# Patient Record
Sex: FEMALE | Race: WHITE | NOT HISPANIC OR LATINO | URBAN - METROPOLITAN AREA
[De-identification: names, ages, dates, MRNs, and addresses within clinical notes are randomized per-mention and may not be internally consistent; named-entity substitution may affect disease eponyms.]

---

## 2019-01-01 ENCOUNTER — HOSPITAL ENCOUNTER (OUTPATIENT)
Dept: PET IMAGING | Facility: HOSPITAL | Age: 69
Setting detail: RADIATION/ONCOLOGY SERIES
Discharge: STILL ADMITTED OR ADMINISTRATIVE | End: 2019-11-04
Attending: INTERNAL MEDICINE

## 2019-01-01 ENCOUNTER — INFUSION - HEALTHEAST (OUTPATIENT)
Dept: INFUSION THERAPY | Facility: CLINIC | Age: 69
End: 2019-01-01

## 2019-01-01 ENCOUNTER — OFFICE VISIT - HEALTHEAST (OUTPATIENT)
Dept: ONCOLOGY | Facility: CLINIC | Age: 69
End: 2019-01-01

## 2019-01-01 ENCOUNTER — COMMUNICATION - HEALTHEAST (OUTPATIENT)
Dept: ONCOLOGY | Facility: HOSPITAL | Age: 69
End: 2019-01-01

## 2019-01-01 ENCOUNTER — HOSPITAL ENCOUNTER (OUTPATIENT)
Dept: PET IMAGING | Facility: HOSPITAL | Age: 69
Setting detail: RADIATION/ONCOLOGY SERIES
Discharge: STILL ADMITTED OR ADMINISTRATIVE | End: 2019-12-31
Attending: INTERNAL MEDICINE

## 2019-01-01 ENCOUNTER — RECORDS - HEALTHEAST (OUTPATIENT)
Dept: ADMINISTRATIVE | Facility: OTHER | Age: 69
End: 2019-01-01

## 2019-01-01 ENCOUNTER — AMBULATORY - HEALTHEAST (OUTPATIENT)
Dept: INFUSION THERAPY | Facility: CLINIC | Age: 69
End: 2019-01-01

## 2019-01-01 ENCOUNTER — HOSPITAL ENCOUNTER (OUTPATIENT)
Dept: INTERVENTIONAL RADIOLOGY/VASCULAR | Facility: HOSPITAL | Age: 69
Setting detail: RADIATION/ONCOLOGY SERIES
Discharge: STILL ADMITTED OR ADMINISTRATIVE | End: 2019-09-03
Attending: INTERNAL MEDICINE

## 2019-01-01 ENCOUNTER — AMBULATORY - HEALTHEAST (OUTPATIENT)
Dept: ONCOLOGY | Facility: HOSPITAL | Age: 69
End: 2019-01-01

## 2019-01-01 ENCOUNTER — HOSPITAL ENCOUNTER (OUTPATIENT)
Dept: PET IMAGING | Facility: HOSPITAL | Age: 69
Setting detail: RADIATION/ONCOLOGY SERIES
Discharge: STILL ADMITTED OR ADMINISTRATIVE | End: 2019-08-30
Attending: INTERNAL MEDICINE

## 2019-01-01 ENCOUNTER — RECORDS - HEALTHEAST (OUTPATIENT)
Dept: LAB | Facility: CLINIC | Age: 69
End: 2019-01-01

## 2019-01-01 ENCOUNTER — HOSPITAL ENCOUNTER (OUTPATIENT)
Dept: INTERVENTIONAL RADIOLOGY/VASCULAR | Facility: CLINIC | Age: 69
Discharge: HOME | End: 2019-08-19
Attending: FAMILY MEDICINE

## 2019-01-01 DIAGNOSIS — Z51.11 ENCOUNTER FOR ANTINEOPLASTIC CHEMOTHERAPY: ICD-10-CM

## 2019-01-01 DIAGNOSIS — C25.1 MALIGNANT NEOPLASM OF BODY OF PANCREAS (H): ICD-10-CM

## 2019-01-01 DIAGNOSIS — C78.7 LIVER METASTASES: ICD-10-CM

## 2019-01-01 DIAGNOSIS — E83.42 HYPOMAGNESEMIA: ICD-10-CM

## 2019-01-01 DIAGNOSIS — E87.6 HYPOKALEMIA: ICD-10-CM

## 2019-01-01 DIAGNOSIS — K63.9 LESION OF COLON: ICD-10-CM

## 2019-01-01 DIAGNOSIS — Z80.3 FAMILY HISTORY OF MALIGNANT NEOPLASM OF BREAST: ICD-10-CM

## 2019-01-01 DIAGNOSIS — E88.09 HYPOALBUMINEMIA: ICD-10-CM

## 2019-01-01 LAB
ALBUMIN SERPL-MCNC: 2.8 G/DL (ref 3.5–5)
ALBUMIN SERPL-MCNC: 2.9 G/DL (ref 3.5–5)
ALBUMIN SERPL-MCNC: 2.9 G/DL (ref 3.5–5)
ALBUMIN SERPL-MCNC: 3 G/DL (ref 3.5–5)
ALBUMIN SERPL-MCNC: 3.2 G/DL (ref 3.5–5)
ALBUMIN SERPL-MCNC: 3.5 G/DL (ref 3.5–5)
ALBUMIN SERPL-MCNC: 3.8 G/DL (ref 3.5–5)
ALP SERPL-CCNC: 159 U/L (ref 45–120)
ALP SERPL-CCNC: 195 U/L (ref 45–120)
ALP SERPL-CCNC: 196 U/L (ref 45–120)
ALP SERPL-CCNC: 232 U/L (ref 45–120)
ALP SERPL-CCNC: 268 U/L (ref 45–120)
ALP SERPL-CCNC: 271 U/L (ref 45–120)
ALP SERPL-CCNC: 298 U/L (ref 45–120)
ALP SERPL-CCNC: 361 U/L (ref 45–120)
ALP SERPL-CCNC: 413 U/L (ref 45–120)
ALP SERPL-CCNC: 424 U/L (ref 45–120)
ALT SERPL W P-5'-P-CCNC: 10 U/L (ref 0–45)
ALT SERPL W P-5'-P-CCNC: 12 U/L (ref 0–45)
ALT SERPL W P-5'-P-CCNC: 15 U/L (ref 0–45)
ALT SERPL W P-5'-P-CCNC: 18 U/L (ref 0–45)
ALT SERPL W P-5'-P-CCNC: 19 U/L (ref 0–45)
ALT SERPL W P-5'-P-CCNC: 23 U/L (ref 0–45)
ALT SERPL W P-5'-P-CCNC: 26 U/L (ref 0–45)
ALT SERPL W P-5'-P-CCNC: 29 U/L (ref 0–45)
ALT SERPL W P-5'-P-CCNC: 32 U/L (ref 0–45)
ALT SERPL W P-5'-P-CCNC: 41 U/L (ref 0–45)
ANION GAP SERPL CALCULATED.3IONS-SCNC: 10 MMOL/L (ref 5–18)
ANION GAP SERPL CALCULATED.3IONS-SCNC: 11 MMOL/L (ref 5–18)
ANION GAP SERPL CALCULATED.3IONS-SCNC: 11 MMOL/L (ref 5–18)
ANION GAP SERPL CALCULATED.3IONS-SCNC: 12 MMOL/L (ref 5–18)
ANION GAP SERPL CALCULATED.3IONS-SCNC: 13 MMOL/L (ref 5–18)
ANION GAP SERPL CALCULATED.3IONS-SCNC: 17 MMOL/L (ref 5–18)
ANION GAP SERPL CALCULATED.3IONS-SCNC: 9 MMOL/L (ref 5–18)
ANION GAP SERPL CALCULATED.3IONS-SCNC: 9 MMOL/L (ref 5–18)
APTT PPP: 25 SECONDS (ref 24–37)
AST SERPL W P-5'-P-CCNC: 21 U/L (ref 0–40)
AST SERPL W P-5'-P-CCNC: 22 U/L (ref 0–40)
AST SERPL W P-5'-P-CCNC: 23 U/L (ref 0–40)
AST SERPL W P-5'-P-CCNC: 27 U/L (ref 0–40)
AST SERPL W P-5'-P-CCNC: 38 U/L (ref 0–40)
AST SERPL W P-5'-P-CCNC: 38 U/L (ref 0–40)
AST SERPL W P-5'-P-CCNC: 40 U/L (ref 0–40)
AST SERPL W P-5'-P-CCNC: 42 U/L (ref 0–40)
AST SERPL W P-5'-P-CCNC: 43 U/L (ref 0–40)
AST SERPL W P-5'-P-CCNC: 59 U/L (ref 0–40)
BASOPHILS # BLD AUTO: 0 THOU/UL (ref 0–0.2)
BASOPHILS # BLD AUTO: 0.1 THOU/UL (ref 0–0.2)
BASOPHILS NFR BLD AUTO: 0 % (ref 0–2)
BASOPHILS NFR BLD AUTO: 1 % (ref 0–2)
BILIRUB SERPL-MCNC: 0.5 MG/DL (ref 0–1)
BILIRUB SERPL-MCNC: 0.6 MG/DL (ref 0–1)
BILIRUB SERPL-MCNC: 0.6 MG/DL (ref 0–1)
BILIRUB SERPL-MCNC: 0.8 MG/DL (ref 0–1)
BUN SERPL-MCNC: 10 MG/DL (ref 8–22)
BUN SERPL-MCNC: 11 MG/DL (ref 8–22)
BUN SERPL-MCNC: 12 MG/DL (ref 8–22)
BUN SERPL-MCNC: 13 MG/DL (ref 8–22)
BUN SERPL-MCNC: 14 MG/DL (ref 8–22)
BUN SERPL-MCNC: 16 MG/DL (ref 8–22)
BUN SERPL-MCNC: 6 MG/DL (ref 8–22)
BUN SERPL-MCNC: 9 MG/DL (ref 8–22)
CALCIUM SERPL-MCNC: 10.3 MG/DL (ref 8.5–10.5)
CALCIUM SERPL-MCNC: 10.5 MG/DL (ref 8.5–10.5)
CALCIUM SERPL-MCNC: 8.8 MG/DL (ref 8.5–10.5)
CALCIUM SERPL-MCNC: 9.1 MG/DL (ref 8.5–10.5)
CALCIUM SERPL-MCNC: 9.2 MG/DL (ref 8.5–10.5)
CALCIUM SERPL-MCNC: 9.3 MG/DL (ref 8.5–10.5)
CALCIUM SERPL-MCNC: 9.4 MG/DL (ref 8.5–10.5)
CALCIUM SERPL-MCNC: 9.5 MG/DL (ref 8.5–10.5)
CALCIUM SERPL-MCNC: 9.6 MG/DL (ref 8.5–10.5)
CALCIUM SERPL-MCNC: 9.6 MG/DL (ref 8.5–10.5)
CANCER AG19-9 SERPL IA-ACNC: ABNORMAL U/ML (ref 0–37)
CEA SERPL-MCNC: 4.4 NG/ML (ref 0–3)
CHLORIDE BLD-SCNC: 100 MMOL/L (ref 98–107)
CHLORIDE BLD-SCNC: 101 MMOL/L (ref 98–107)
CHLORIDE BLD-SCNC: 102 MMOL/L (ref 98–107)
CHLORIDE BLD-SCNC: 105 MMOL/L (ref 98–107)
CHLORIDE BLD-SCNC: 98 MMOL/L (ref 98–107)
CHLORIDE BLD-SCNC: 99 MMOL/L (ref 98–107)
CO2 SERPL-SCNC: 21 MMOL/L (ref 22–31)
CO2 SERPL-SCNC: 24 MMOL/L (ref 22–31)
CO2 SERPL-SCNC: 24 MMOL/L (ref 22–31)
CO2 SERPL-SCNC: 25 MMOL/L (ref 22–31)
CO2 SERPL-SCNC: 27 MMOL/L (ref 22–31)
CREAT SERPL-MCNC: 0.57 MG/DL (ref 0.6–1.1)
CREAT SERPL-MCNC: 0.64 MG/DL (ref 0.6–1.1)
CREAT SERPL-MCNC: 0.66 MG/DL (ref 0.6–1.1)
CREAT SERPL-MCNC: 0.67 MG/DL (ref 0.6–1.1)
CREAT SERPL-MCNC: 0.68 MG/DL (ref 0.6–1.1)
CREAT SERPL-MCNC: 0.68 MG/DL (ref 0.6–1.1)
CREAT SERPL-MCNC: 0.69 MG/DL (ref 0.6–1.1)
CREAT SERPL-MCNC: 0.7 MG/DL (ref 0.6–1.1)
CREAT SERPL-MCNC: 0.7 MG/DL (ref 0.6–1.1)
CREAT SERPL-MCNC: 0.73 MG/DL (ref 0.6–1.1)
EOSINOPHIL # BLD AUTO: 0.1 THOU/UL (ref 0–0.4)
EOSINOPHIL # BLD AUTO: 0.2 THOU/UL (ref 0–0.4)
EOSINOPHIL # BLD AUTO: 0.2 THOU/UL (ref 0–0.4)
EOSINOPHIL # BLD AUTO: 0.4 THOU/UL (ref 0–0.4)
EOSINOPHIL # BLD AUTO: 0.4 THOU/UL (ref 0–0.4)
EOSINOPHIL NFR BLD AUTO: 1 % (ref 0–6)
EOSINOPHIL NFR BLD AUTO: 2 % (ref 0–6)
EOSINOPHIL NFR BLD AUTO: 4 % (ref 0–6)
EOSINOPHIL NFR BLD AUTO: 5 % (ref 0–6)
ERYTHROCYTE [DISTWIDTH] IN BLOOD BY AUTOMATED COUNT: 12.6 % (ref 11–14.5)
ERYTHROCYTE [DISTWIDTH] IN BLOOD BY AUTOMATED COUNT: 13.2 % (ref 11–14.5)
ERYTHROCYTE [DISTWIDTH] IN BLOOD BY AUTOMATED COUNT: 14 % (ref 11–14.5)
ERYTHROCYTE [DISTWIDTH] IN BLOOD BY AUTOMATED COUNT: 15.6 % (ref 11–14.5)
ERYTHROCYTE [DISTWIDTH] IN BLOOD BY AUTOMATED COUNT: 17.9 % (ref 11–14.5)
ERYTHROCYTE [DISTWIDTH] IN BLOOD BY AUTOMATED COUNT: 18.9 % (ref 11–14.5)
ERYTHROCYTE [DISTWIDTH] IN BLOOD BY AUTOMATED COUNT: 19.6 % (ref 11–14.5)
ERYTHROCYTE [DISTWIDTH] IN BLOOD BY AUTOMATED COUNT: 19.8 % (ref 11–14.5)
ERYTHROCYTE [DISTWIDTH] IN BLOOD BY AUTOMATED COUNT: 20 % (ref 11–14.5)
GFR SERPL CREATININE-BSD FRML MDRD: >60 ML/MIN/1.73M2
GLUCOSE BLD-MCNC: 122 MG/DL (ref 70–125)
GLUCOSE BLD-MCNC: 123 MG/DL (ref 70–125)
GLUCOSE BLD-MCNC: 124 MG/DL (ref 70–125)
GLUCOSE BLD-MCNC: 140 MG/DL (ref 70–125)
GLUCOSE BLD-MCNC: 141 MG/DL (ref 70–125)
GLUCOSE BLD-MCNC: 155 MG/DL (ref 70–125)
GLUCOSE BLD-MCNC: 163 MG/DL (ref 70–125)
GLUCOSE BLD-MCNC: 173 MG/DL (ref 70–125)
GLUCOSE BLD-MCNC: 190 MG/DL (ref 70–125)
GLUCOSE BLD-MCNC: 192 MG/DL (ref 70–125)
GLUCOSE BLDC GLUCOMTR-MCNC: 121 MG/DL (ref 70–139)
GLUCOSE BLDC GLUCOMTR-MCNC: 127 MG/DL (ref 70–139)
GLUCOSE BLDC GLUCOMTR-MCNC: 129 MG/DL (ref 70–139)
HCT VFR BLD AUTO: 31.5 % (ref 35–47)
HCT VFR BLD AUTO: 31.6 % (ref 35–47)
HCT VFR BLD AUTO: 32.5 % (ref 35–47)
HCT VFR BLD AUTO: 34.5 % (ref 35–47)
HCT VFR BLD AUTO: 35.1 % (ref 35–47)
HCT VFR BLD AUTO: 35.6 % (ref 35–47)
HCT VFR BLD AUTO: 35.9 % (ref 35–47)
HCT VFR BLD AUTO: 36.3 % (ref 35–47)
HCT VFR BLD AUTO: 39 % (ref 35–47)
HGB BLD-MCNC: 10.2 G/DL (ref 12–16)
HGB BLD-MCNC: 10.3 G/DL (ref 12–16)
HGB BLD-MCNC: 10.5 G/DL (ref 12–16)
HGB BLD-MCNC: 11.2 G/DL (ref 12–16)
HGB BLD-MCNC: 11.2 G/DL (ref 12–16)
HGB BLD-MCNC: 11.4 G/DL (ref 12–16)
HGB BLD-MCNC: 11.6 G/DL (ref 12–16)
HGB BLD-MCNC: 11.8 G/DL (ref 12–16)
HGB BLD-MCNC: 12.8 G/DL (ref 12–16)
INR PPP: 1.07 (ref 0.9–1.1)
LIPASE SERPL-CCNC: 9 U/L (ref 0–52)
LYMPHOCYTES # BLD AUTO: 0.8 THOU/UL (ref 0.8–4.4)
LYMPHOCYTES # BLD AUTO: 0.9 THOU/UL (ref 0.8–4.4)
LYMPHOCYTES # BLD AUTO: 1 THOU/UL (ref 0.8–4.4)
LYMPHOCYTES # BLD AUTO: 1.1 THOU/UL (ref 0.8–4.4)
LYMPHOCYTES # BLD AUTO: 1.1 THOU/UL (ref 0.8–4.4)
LYMPHOCYTES # BLD AUTO: 1.2 THOU/UL (ref 0.8–4.4)
LYMPHOCYTES # BLD AUTO: 1.3 THOU/UL (ref 0.8–4.4)
LYMPHOCYTES # BLD AUTO: 1.4 THOU/UL (ref 0.8–4.4)
LYMPHOCYTES # BLD AUTO: 1.4 THOU/UL (ref 0.8–4.4)
LYMPHOCYTES NFR BLD AUTO: 10 % (ref 20–40)
LYMPHOCYTES NFR BLD AUTO: 12 % (ref 20–40)
LYMPHOCYTES NFR BLD AUTO: 12 % (ref 20–40)
LYMPHOCYTES NFR BLD AUTO: 13 % (ref 20–40)
LYMPHOCYTES NFR BLD AUTO: 14 % (ref 20–40)
LYMPHOCYTES NFR BLD AUTO: 16 % (ref 20–40)
LYMPHOCYTES NFR BLD AUTO: 8 % (ref 20–40)
MAGNESIUM SERPL-MCNC: 1.6 MG/DL (ref 1.8–2.6)
MAGNESIUM SERPL-MCNC: 1.6 MG/DL (ref 1.8–2.6)
MAGNESIUM SERPL-MCNC: 1.7 MG/DL (ref 1.8–2.6)
MAGNESIUM SERPL-MCNC: 1.8 MG/DL (ref 1.8–2.6)
MAGNESIUM SERPL-MCNC: 1.9 MG/DL (ref 1.8–2.6)
MAGNESIUM SERPL-MCNC: 2 MG/DL (ref 1.8–2.6)
MAGNESIUM SERPL-MCNC: 2 MG/DL (ref 1.8–2.6)
MAGNESIUM SERPL-MCNC: 2.3 MG/DL (ref 1.8–2.6)
MCH RBC QN AUTO: 29.6 PG (ref 27–34)
MCH RBC QN AUTO: 29.9 PG (ref 27–34)
MCH RBC QN AUTO: 30.2 PG (ref 27–34)
MCH RBC QN AUTO: 30.7 PG (ref 27–34)
MCH RBC QN AUTO: 31 PG (ref 27–34)
MCH RBC QN AUTO: 31.6 PG (ref 27–34)
MCH RBC QN AUTO: 32.5 PG (ref 27–34)
MCHC RBC AUTO-ENTMCNC: 31.9 G/DL (ref 32–36)
MCHC RBC AUTO-ENTMCNC: 32 G/DL (ref 32–36)
MCHC RBC AUTO-ENTMCNC: 32.3 G/DL (ref 32–36)
MCHC RBC AUTO-ENTMCNC: 32.3 G/DL (ref 32–36)
MCHC RBC AUTO-ENTMCNC: 32.4 G/DL (ref 32–36)
MCHC RBC AUTO-ENTMCNC: 32.5 G/DL (ref 32–36)
MCHC RBC AUTO-ENTMCNC: 32.5 G/DL (ref 32–36)
MCHC RBC AUTO-ENTMCNC: 32.6 G/DL (ref 32–36)
MCHC RBC AUTO-ENTMCNC: 32.8 G/DL (ref 32–36)
MCV RBC AUTO: 100 FL (ref 80–100)
MCV RBC AUTO: 91 FL (ref 80–100)
MCV RBC AUTO: 92 FL (ref 80–100)
MCV RBC AUTO: 93 FL (ref 80–100)
MCV RBC AUTO: 93 FL (ref 80–100)
MCV RBC AUTO: 94 FL (ref 80–100)
MCV RBC AUTO: 95 FL (ref 80–100)
MCV RBC AUTO: 96 FL (ref 80–100)
MCV RBC AUTO: 98 FL (ref 80–100)
MONOCYTES # BLD AUTO: 0.6 THOU/UL (ref 0–0.9)
MONOCYTES # BLD AUTO: 0.6 THOU/UL (ref 0–0.9)
MONOCYTES # BLD AUTO: 0.7 THOU/UL (ref 0–0.9)
MONOCYTES # BLD AUTO: 0.8 THOU/UL (ref 0–0.9)
MONOCYTES # BLD AUTO: 0.9 THOU/UL (ref 0–0.9)
MONOCYTES NFR BLD AUTO: 10 % (ref 2–10)
MONOCYTES NFR BLD AUTO: 6 % (ref 2–10)
MONOCYTES NFR BLD AUTO: 7 % (ref 2–10)
MONOCYTES NFR BLD AUTO: 8 % (ref 2–10)
MONOCYTES NFR BLD AUTO: 9 % (ref 2–10)
MONOCYTES NFR BLD AUTO: 9 % (ref 2–10)
NEUTROPHILS # BLD AUTO: 5.3 THOU/UL (ref 2–7.7)
NEUTROPHILS # BLD AUTO: 5.6 THOU/UL (ref 2–7.7)
NEUTROPHILS # BLD AUTO: 5.6 THOU/UL (ref 2–7.7)
NEUTROPHILS # BLD AUTO: 7.2 THOU/UL (ref 2–7.7)
NEUTROPHILS # BLD AUTO: 7.3 THOU/UL (ref 2–7.7)
NEUTROPHILS # BLD AUTO: 7.5 THOU/UL (ref 2–7.7)
NEUTROPHILS # BLD AUTO: 8 THOU/UL (ref 2–7.7)
NEUTROPHILS # BLD AUTO: 8.1 THOU/UL (ref 2–7.7)
NEUTROPHILS # BLD AUTO: 9.7 THOU/UL (ref 2–7.7)
NEUTROPHILS NFR BLD AUTO: 74 % (ref 50–70)
NEUTROPHILS NFR BLD AUTO: 75 % (ref 50–70)
NEUTROPHILS NFR BLD AUTO: 76 % (ref 50–70)
NEUTROPHILS NFR BLD AUTO: 78 % (ref 50–70)
NEUTROPHILS NFR BLD AUTO: 79 % (ref 50–70)
NEUTROPHILS NFR BLD AUTO: 79 % (ref 50–70)
PLATELET # BLD AUTO: 121 THOU/UL (ref 140–440)
PLATELET # BLD AUTO: 130 THOU/UL (ref 140–440)
PLATELET # BLD AUTO: 139 THOU/UL (ref 140–440)
PLATELET # BLD AUTO: 166 THOU/UL (ref 140–440)
PLATELET # BLD AUTO: 201 THOU/UL (ref 140–440)
PLATELET # BLD AUTO: 234 THOU/UL (ref 140–440)
PLATELET # BLD AUTO: 237 THOU/UL (ref 140–440)
PLATELET # BLD AUTO: 244 THOU/UL (ref 140–440)
PLATELET # BLD AUTO: 258 THOU/UL (ref 140–440)
PLATELET # BLD AUTO: 273 THOU/UL (ref 140–440)
PMV BLD AUTO: 8.5 FL (ref 8.5–12.5)
PMV BLD AUTO: 8.9 FL (ref 8.5–12.5)
PMV BLD AUTO: 8.9 FL (ref 8.5–12.5)
PMV BLD AUTO: 9.1 FL (ref 8.5–12.5)
PMV BLD AUTO: 9.3 FL (ref 8.5–12.5)
PMV BLD AUTO: 9.4 FL (ref 8.5–12.5)
POTASSIUM BLD-SCNC: 3 MMOL/L (ref 3.5–5)
POTASSIUM BLD-SCNC: 3.4 MMOL/L (ref 3.5–5)
POTASSIUM BLD-SCNC: 3.5 MMOL/L (ref 3.5–5)
POTASSIUM BLD-SCNC: 3.5 MMOL/L (ref 3.5–5)
POTASSIUM BLD-SCNC: 3.8 MMOL/L (ref 3.5–5)
POTASSIUM BLD-SCNC: 4.1 MMOL/L (ref 3.5–5)
POTASSIUM BLD-SCNC: 4.2 MMOL/L (ref 3.5–5)
POTASSIUM BLD-SCNC: 4.2 MMOL/L (ref 3.5–5)
POTASSIUM BLD-SCNC: 4.3 MMOL/L (ref 3.5–5)
POTASSIUM BLD-SCNC: 4.4 MMOL/L (ref 3.5–5)
PROT SERPL-MCNC: 6.3 G/DL (ref 6–8)
PROT SERPL-MCNC: 6.4 G/DL (ref 6–8)
PROT SERPL-MCNC: 6.6 G/DL (ref 6–8)
PROT SERPL-MCNC: 6.8 G/DL (ref 6–8)
PROT SERPL-MCNC: 7.1 G/DL (ref 6–8)
PROT SERPL-MCNC: 7.2 G/DL (ref 6–8)
PROT SERPL-MCNC: 7.7 G/DL (ref 6–8)
PROT SERPL-MCNC: 8.2 G/DL (ref 6–8)
PROT SERPL-MCNC: 8.3 G/DL (ref 6–8)
PROT SERPL-MCNC: 8.7 G/DL (ref 6–8)
RBC # BLD AUTO: 3.17 MILL/UL (ref 3.8–5.4)
RBC # BLD AUTO: 3.23 MILL/UL (ref 3.8–5.4)
RBC # BLD AUTO: 3.39 MILL/UL (ref 3.8–5.4)
RBC # BLD AUTO: 3.65 MILL/UL (ref 3.8–5.4)
RBC # BLD AUTO: 3.78 MILL/UL (ref 3.8–5.4)
RBC # BLD AUTO: 3.78 MILL/UL (ref 3.8–5.4)
RBC # BLD AUTO: 3.88 MILL/UL (ref 3.8–5.4)
RBC # BLD AUTO: 3.94 MILL/UL (ref 3.8–5.4)
RBC # BLD AUTO: 4.28 MILL/UL (ref 3.8–5.4)
SODIUM SERPL-SCNC: 134 MMOL/L (ref 136–145)
SODIUM SERPL-SCNC: 136 MMOL/L (ref 136–145)
SODIUM SERPL-SCNC: 137 MMOL/L (ref 136–145)
SODIUM SERPL-SCNC: 137 MMOL/L (ref 136–145)
SODIUM SERPL-SCNC: 139 MMOL/L (ref 136–145)
SODIUM SERPL-SCNC: 139 MMOL/L (ref 136–145)
SODIUM SERPL-SCNC: 141 MMOL/L (ref 136–145)
WBC: 10.7 THOU/UL (ref 4–11)
WBC: 10.7 THOU/UL (ref 4–11)
WBC: 12.3 THOU/UL (ref 4–11)
WBC: 7.2 THOU/UL (ref 4–11)
WBC: 7.5 THOU/UL (ref 4–11)
WBC: 7.5 THOU/UL (ref 4–11)
WBC: 9.2 THOU/UL (ref 4–11)
WBC: 9.6 THOU/UL (ref 4–11)
WBC: 9.7 THOU/UL (ref 4–11)

## 2019-01-01 RX ORDER — ACETAMINOPHEN 500 MG
500 TABLET ORAL EVERY 6 HOURS PRN
Status: SHIPPED | COMMUNITY
Start: 2019-01-01

## 2019-01-01 RX ORDER — ONDANSETRON 8 MG/1
8 TABLET, FILM COATED ORAL EVERY 8 HOURS PRN
Qty: 30 TABLET | Refills: 3 | Status: SHIPPED | OUTPATIENT
Start: 2019-01-01

## 2019-01-01 ASSESSMENT — MIFFLIN-ST. JEOR
SCORE: 1246.63
SCORE: 1192.2
SCORE: 1254.34
SCORE: 1247.99
SCORE: 1183.13

## 2020-01-01 ENCOUNTER — AMBULATORY - HEALTHEAST (OUTPATIENT)
Dept: INFUSION THERAPY | Facility: CLINIC | Age: 70
End: 2020-01-01

## 2020-01-01 ENCOUNTER — OFFICE VISIT - HEALTHEAST (OUTPATIENT)
Dept: ONCOLOGY | Facility: CLINIC | Age: 70
End: 2020-01-01

## 2020-01-01 ENCOUNTER — INFUSION - HEALTHEAST (OUTPATIENT)
Dept: INFUSION THERAPY | Facility: CLINIC | Age: 70
End: 2020-01-01

## 2020-01-01 ENCOUNTER — COMMUNICATION - HEALTHEAST (OUTPATIENT)
Dept: ADMINISTRATIVE | Facility: HOSPITAL | Age: 70
End: 2020-01-01

## 2020-01-01 ENCOUNTER — INFUSION - HEALTHEAST (OUTPATIENT)
Dept: INFUSION THERAPY | Facility: HOSPITAL | Age: 70
End: 2020-01-01

## 2020-01-01 ENCOUNTER — AMBULATORY - HEALTHEAST (OUTPATIENT)
Dept: ONCOLOGY | Facility: CLINIC | Age: 70
End: 2020-01-01

## 2020-01-01 ENCOUNTER — AMBULATORY - HEALTHEAST (OUTPATIENT)
Dept: CARE COORDINATION | Facility: CLINIC | Age: 70
End: 2020-01-01

## 2020-01-01 ENCOUNTER — HOSPITAL ENCOUNTER (OUTPATIENT)
Dept: PET IMAGING | Facility: HOSPITAL | Age: 70
Setting detail: RADIATION/ONCOLOGY SERIES
Discharge: STILL ADMITTED OR ADMINISTRATIVE | End: 2020-03-02
Attending: INTERNAL MEDICINE

## 2020-01-01 ENCOUNTER — COMMUNICATION - HEALTHEAST (OUTPATIENT)
Dept: CARE COORDINATION | Facility: CLINIC | Age: 70
End: 2020-01-01

## 2020-01-01 DIAGNOSIS — K63.9 LESION OF COLON: ICD-10-CM

## 2020-01-01 DIAGNOSIS — C25.1 MALIGNANT NEOPLASM OF BODY OF PANCREAS (H): ICD-10-CM

## 2020-01-01 DIAGNOSIS — Z51.11 ENCOUNTER FOR ANTINEOPLASTIC CHEMOTHERAPY: ICD-10-CM

## 2020-01-01 DIAGNOSIS — D69.59 CHEMOTHERAPY-INDUCED THROMBOCYTOPENIA: ICD-10-CM

## 2020-01-01 DIAGNOSIS — T45.1X5A CHEMOTHERAPY-INDUCED THROMBOCYTOPENIA: ICD-10-CM

## 2020-01-01 DIAGNOSIS — C78.7 LIVER METASTASES: ICD-10-CM

## 2020-01-01 DIAGNOSIS — E88.09 HYPOALBUMINEMIA: ICD-10-CM

## 2020-01-01 DIAGNOSIS — Z80.3 FAMILY HISTORY OF MALIGNANT NEOPLASM OF BREAST: ICD-10-CM

## 2020-01-01 DIAGNOSIS — E87.6 HYPOKALEMIA: ICD-10-CM

## 2020-01-01 DIAGNOSIS — E83.42 HYPOMAGNESEMIA: ICD-10-CM

## 2020-01-01 DIAGNOSIS — I63.512 ACUTE ISCHEMIC LEFT MCA STROKE (H): ICD-10-CM

## 2020-01-01 LAB
ALBUMIN SERPL-MCNC: 2.9 G/DL (ref 3.5–5)
ALBUMIN SERPL-MCNC: 2.9 G/DL (ref 3.5–5)
ALBUMIN SERPL-MCNC: 3 G/DL (ref 3.5–5)
ALBUMIN SERPL-MCNC: 3 G/DL (ref 3.5–5)
ALBUMIN SERPL-MCNC: 3.1 G/DL (ref 3.5–5)
ALP SERPL-CCNC: 178 U/L (ref 45–120)
ALP SERPL-CCNC: 189 U/L (ref 45–120)
ALP SERPL-CCNC: 198 U/L (ref 45–120)
ALP SERPL-CCNC: 217 U/L (ref 45–120)
ALP SERPL-CCNC: 237 U/L (ref 45–120)
ALT SERPL W P-5'-P-CCNC: 11 U/L (ref 0–45)
ALT SERPL W P-5'-P-CCNC: 11 U/L (ref 0–45)
ALT SERPL W P-5'-P-CCNC: 12 U/L (ref 0–45)
ALT SERPL W P-5'-P-CCNC: 14 U/L (ref 0–45)
ALT SERPL W P-5'-P-CCNC: 16 U/L (ref 0–45)
ANION GAP SERPL CALCULATED.3IONS-SCNC: 10 MMOL/L (ref 5–18)
ANION GAP SERPL CALCULATED.3IONS-SCNC: 11 MMOL/L (ref 5–18)
ANION GAP SERPL CALCULATED.3IONS-SCNC: 11 MMOL/L (ref 5–18)
ANION GAP SERPL CALCULATED.3IONS-SCNC: 12 MMOL/L (ref 5–18)
ANION GAP SERPL CALCULATED.3IONS-SCNC: 12 MMOL/L (ref 5–18)
AST SERPL W P-5'-P-CCNC: 20 U/L (ref 0–40)
AST SERPL W P-5'-P-CCNC: 24 U/L (ref 0–40)
AST SERPL W P-5'-P-CCNC: 26 U/L (ref 0–40)
AST SERPL W P-5'-P-CCNC: 28 U/L (ref 0–40)
AST SERPL W P-5'-P-CCNC: 29 U/L (ref 0–40)
BASOPHILS # BLD AUTO: 0 THOU/UL (ref 0–0.2)
BASOPHILS NFR BLD AUTO: 0 % (ref 0–2)
BASOPHILS NFR BLD AUTO: 1 % (ref 0–2)
BILIRUB SERPL-MCNC: 0.5 MG/DL (ref 0–1)
BILIRUB SERPL-MCNC: 0.5 MG/DL (ref 0–1)
BILIRUB SERPL-MCNC: 0.6 MG/DL (ref 0–1)
BILIRUB SERPL-MCNC: 0.6 MG/DL (ref 0–1)
BILIRUB SERPL-MCNC: 0.9 MG/DL (ref 0–1)
BUN SERPL-MCNC: 13 MG/DL (ref 8–22)
BUN SERPL-MCNC: 14 MG/DL (ref 8–22)
BUN SERPL-MCNC: 18 MG/DL (ref 8–22)
CALCIUM SERPL-MCNC: 8.8 MG/DL (ref 8.5–10.5)
CALCIUM SERPL-MCNC: 9.1 MG/DL (ref 8.5–10.5)
CALCIUM SERPL-MCNC: 9.1 MG/DL (ref 8.5–10.5)
CALCIUM SERPL-MCNC: 9.2 MG/DL (ref 8.5–10.5)
CALCIUM SERPL-MCNC: 9.4 MG/DL (ref 8.5–10.5)
CANCER AG19-9 SERPL IA-ACNC: 8760 U/ML (ref 0–37)
CANCER AG19-9 SERPL IA-ACNC: ABNORMAL U/ML (ref 0–37)
CHLORIDE BLD-SCNC: 101 MMOL/L (ref 98–107)
CHLORIDE BLD-SCNC: 104 MMOL/L (ref 98–107)
CHLORIDE BLD-SCNC: 105 MMOL/L (ref 98–107)
CO2 SERPL-SCNC: 20 MMOL/L (ref 22–31)
CO2 SERPL-SCNC: 22 MMOL/L (ref 22–31)
CO2 SERPL-SCNC: 24 MMOL/L (ref 22–31)
CREAT SERPL-MCNC: 0.52 MG/DL (ref 0.6–1.1)
CREAT SERPL-MCNC: 0.57 MG/DL (ref 0.6–1.1)
CREAT SERPL-MCNC: 0.57 MG/DL (ref 0.6–1.1)
CREAT SERPL-MCNC: 0.6 MG/DL (ref 0.6–1.1)
CREAT SERPL-MCNC: 0.6 MG/DL (ref 0.6–1.1)
EOSINOPHIL # BLD AUTO: 0.1 THOU/UL (ref 0–0.4)
EOSINOPHIL # BLD AUTO: 0.2 THOU/UL (ref 0–0.4)
EOSINOPHIL # BLD AUTO: 0.2 THOU/UL (ref 0–0.4)
EOSINOPHIL NFR BLD AUTO: 1 % (ref 0–6)
EOSINOPHIL NFR BLD AUTO: 2 % (ref 0–6)
EOSINOPHIL NFR BLD AUTO: 3 % (ref 0–6)
ERYTHROCYTE [DISTWIDTH] IN BLOOD BY AUTOMATED COUNT: 17.4 % (ref 11–14.5)
ERYTHROCYTE [DISTWIDTH] IN BLOOD BY AUTOMATED COUNT: 17.9 % (ref 11–14.5)
ERYTHROCYTE [DISTWIDTH] IN BLOOD BY AUTOMATED COUNT: 19.6 % (ref 11–14.5)
GFR SERPL CREATININE-BSD FRML MDRD: >60 ML/MIN/1.73M2
GLUCOSE BLD-MCNC: 112 MG/DL (ref 70–125)
GLUCOSE BLD-MCNC: 132 MG/DL (ref 70–125)
GLUCOSE BLD-MCNC: 142 MG/DL (ref 70–125)
GLUCOSE BLD-MCNC: 151 MG/DL (ref 70–125)
GLUCOSE BLD-MCNC: 165 MG/DL (ref 70–125)
GLUCOSE BLDC GLUCOMTR-MCNC: 128 MG/DL (ref 70–139)
HCT VFR BLD AUTO: 29.1 % (ref 35–47)
HCT VFR BLD AUTO: 29.6 % (ref 35–47)
HCT VFR BLD AUTO: 30.6 % (ref 35–47)
HCT VFR BLD AUTO: 30.7 % (ref 35–47)
HCT VFR BLD AUTO: 31.6 % (ref 35–47)
HGB BLD-MCNC: 10.1 G/DL (ref 12–16)
HGB BLD-MCNC: 9.3 G/DL (ref 12–16)
HGB BLD-MCNC: 9.5 G/DL (ref 12–16)
HGB BLD-MCNC: 9.7 G/DL (ref 12–16)
HGB BLD-MCNC: 9.8 G/DL (ref 12–16)
LYMPHOCYTES # BLD AUTO: 0.8 THOU/UL (ref 0.8–4.4)
LYMPHOCYTES # BLD AUTO: 0.8 THOU/UL (ref 0.8–4.4)
LYMPHOCYTES # BLD AUTO: 0.9 THOU/UL (ref 0.8–4.4)
LYMPHOCYTES # BLD AUTO: 1 THOU/UL (ref 0.8–4.4)
LYMPHOCYTES # BLD AUTO: 1 THOU/UL (ref 0.8–4.4)
LYMPHOCYTES NFR BLD AUTO: 10 % (ref 20–40)
LYMPHOCYTES NFR BLD AUTO: 11 % (ref 20–40)
LYMPHOCYTES NFR BLD AUTO: 11 % (ref 20–40)
LYMPHOCYTES NFR BLD AUTO: 12 % (ref 20–40)
LYMPHOCYTES NFR BLD AUTO: 16 % (ref 20–40)
MAGNESIUM SERPL-MCNC: 1.5 MG/DL (ref 1.8–2.6)
MAGNESIUM SERPL-MCNC: 1.6 MG/DL (ref 1.8–2.6)
MAGNESIUM SERPL-MCNC: 1.8 MG/DL (ref 1.8–2.6)
MCH RBC QN AUTO: 33.1 PG (ref 27–34)
MCH RBC QN AUTO: 33.5 PG (ref 27–34)
MCH RBC QN AUTO: 33.8 PG (ref 27–34)
MCH RBC QN AUTO: 33.8 PG (ref 27–34)
MCH RBC QN AUTO: 34.1 PG (ref 27–34)
MCHC RBC AUTO-ENTMCNC: 31.6 G/DL (ref 32–36)
MCHC RBC AUTO-ENTMCNC: 32 G/DL (ref 32–36)
MCHC RBC AUTO-ENTMCNC: 32.1 G/DL (ref 32–36)
MCV RBC AUTO: 103 FL (ref 80–100)
MCV RBC AUTO: 104 FL (ref 80–100)
MCV RBC AUTO: 106 FL (ref 80–100)
MCV RBC AUTO: 107 FL (ref 80–100)
MCV RBC AUTO: 107 FL (ref 80–100)
MONOCYTES # BLD AUTO: 0.6 THOU/UL (ref 0–0.9)
MONOCYTES # BLD AUTO: 0.6 THOU/UL (ref 0–0.9)
MONOCYTES # BLD AUTO: 0.7 THOU/UL (ref 0–0.9)
MONOCYTES # BLD AUTO: 0.8 THOU/UL (ref 0–0.9)
MONOCYTES # BLD AUTO: 0.8 THOU/UL (ref 0–0.9)
MONOCYTES NFR BLD AUTO: 12 % (ref 2–10)
MONOCYTES NFR BLD AUTO: 8 % (ref 2–10)
MONOCYTES NFR BLD AUTO: 9 % (ref 2–10)
NEUTROPHILS # BLD AUTO: 3.7 THOU/UL (ref 2–7.7)
NEUTROPHILS # BLD AUTO: 5.6 THOU/UL (ref 2–7.7)
NEUTROPHILS # BLD AUTO: 5.6 THOU/UL (ref 2–7.7)
NEUTROPHILS # BLD AUTO: 6.4 THOU/UL (ref 2–7.7)
NEUTROPHILS # BLD AUTO: 6.6 THOU/UL (ref 2–7.7)
NEUTROPHILS NFR BLD AUTO: 69 % (ref 50–70)
NEUTROPHILS NFR BLD AUTO: 77 % (ref 50–70)
NEUTROPHILS NFR BLD AUTO: 78 % (ref 50–70)
PLAT MORPH BLD: ABNORMAL
PLATELET # BLD AUTO: 121 THOU/UL (ref 140–440)
PLATELET # BLD AUTO: 123 THOU/UL (ref 140–440)
PLATELET # BLD AUTO: 125 THOU/UL (ref 140–440)
PLATELET # BLD AUTO: 126 THOU/UL (ref 140–440)
PLATELET # BLD AUTO: 85 THOU/UL (ref 140–440)
PMV BLD AUTO: 10.3 FL (ref 8.5–12.5)
PMV BLD AUTO: 9.4 FL (ref 8.5–12.5)
PMV BLD AUTO: 9.6 FL (ref 8.5–12.5)
PMV BLD AUTO: 9.8 FL (ref 8.5–12.5)
PMV BLD AUTO: 9.9 FL (ref 8.5–12.5)
POTASSIUM BLD-SCNC: 3.2 MMOL/L (ref 3.5–5)
POTASSIUM BLD-SCNC: 3.4 MMOL/L (ref 3.5–5)
POTASSIUM BLD-SCNC: 3.6 MMOL/L (ref 3.5–5)
POTASSIUM BLD-SCNC: 3.6 MMOL/L (ref 3.5–5)
POTASSIUM BLD-SCNC: 4.3 MMOL/L (ref 3.5–5)
PROT SERPL-MCNC: 6.3 G/DL (ref 6–8)
PROT SERPL-MCNC: 6.4 G/DL (ref 6–8)
PROT SERPL-MCNC: 6.5 G/DL (ref 6–8)
PROT SERPL-MCNC: 6.7 G/DL (ref 6–8)
PROT SERPL-MCNC: 6.7 G/DL (ref 6–8)
RBC # BLD AUTO: 2.73 MILL/UL (ref 3.8–5.4)
RBC # BLD AUTO: 2.84 MILL/UL (ref 3.8–5.4)
RBC # BLD AUTO: 2.87 MILL/UL (ref 3.8–5.4)
RBC # BLD AUTO: 2.96 MILL/UL (ref 3.8–5.4)
RBC # BLD AUTO: 2.99 MILL/UL (ref 3.8–5.4)
SODIUM SERPL-SCNC: 137 MMOL/L (ref 136–145)
SODIUM SERPL-SCNC: 138 MMOL/L (ref 136–145)
SODIUM SERPL-SCNC: 139 MMOL/L (ref 136–145)
WBC: 5.3 THOU/UL (ref 4–11)
WBC: 7.2 THOU/UL (ref 4–11)
WBC: 7.3 THOU/UL (ref 4–11)
WBC: 8.3 THOU/UL (ref 4–11)
WBC: 8.6 THOU/UL (ref 4–11)

## 2020-01-01 RX ORDER — POTASSIUM CHLORIDE 1500 MG/1
20 TABLET, EXTENDED RELEASE ORAL DAILY
Qty: 30 TABLET | Refills: 2 | Status: SHIPPED | OUTPATIENT
Start: 2020-01-01

## 2020-01-01 RX ORDER — MAGNESIUM OXIDE 400 MG/1
400 TABLET ORAL DAILY
Qty: 30 TABLET | Refills: 5 | Status: SHIPPED | OUTPATIENT
Start: 2020-01-01

## 2020-01-01 ASSESSMENT — MIFFLIN-ST. JEOR: SCORE: 1174.06

## 2021-05-26 VITALS
OXYGEN SATURATION: 99 % | TEMPERATURE: 97.6 F | DIASTOLIC BLOOD PRESSURE: 70 MMHG | HEART RATE: 88 BPM | SYSTOLIC BLOOD PRESSURE: 137 MMHG

## 2021-05-26 VITALS
HEART RATE: 81 BPM | OXYGEN SATURATION: 97 % | SYSTOLIC BLOOD PRESSURE: 130 MMHG | DIASTOLIC BLOOD PRESSURE: 73 MMHG | TEMPERATURE: 98 F

## 2021-05-26 VITALS
TEMPERATURE: 97.8 F | DIASTOLIC BLOOD PRESSURE: 83 MMHG | SYSTOLIC BLOOD PRESSURE: 143 MMHG | HEART RATE: 93 BPM | OXYGEN SATURATION: 97 %

## 2021-05-26 VITALS
TEMPERATURE: 98.3 F | OXYGEN SATURATION: 97 % | SYSTOLIC BLOOD PRESSURE: 129 MMHG | HEART RATE: 80 BPM | DIASTOLIC BLOOD PRESSURE: 81 MMHG

## 2021-05-26 VITALS
DIASTOLIC BLOOD PRESSURE: 77 MMHG | TEMPERATURE: 98 F | HEART RATE: 87 BPM | SYSTOLIC BLOOD PRESSURE: 137 MMHG | OXYGEN SATURATION: 96 %

## 2021-05-26 VITALS
TEMPERATURE: 97.8 F | HEART RATE: 91 BPM | SYSTOLIC BLOOD PRESSURE: 133 MMHG | DIASTOLIC BLOOD PRESSURE: 82 MMHG | OXYGEN SATURATION: 98 %

## 2021-05-26 VITALS
SYSTOLIC BLOOD PRESSURE: 146 MMHG | TEMPERATURE: 98 F | OXYGEN SATURATION: 97 % | DIASTOLIC BLOOD PRESSURE: 80 MMHG | HEART RATE: 100 BPM

## 2021-05-26 VITALS
DIASTOLIC BLOOD PRESSURE: 84 MMHG | SYSTOLIC BLOOD PRESSURE: 162 MMHG | HEART RATE: 102 BPM | TEMPERATURE: 97.5 F | OXYGEN SATURATION: 97 %

## 2021-05-26 VITALS
TEMPERATURE: 98.5 F | SYSTOLIC BLOOD PRESSURE: 138 MMHG | OXYGEN SATURATION: 98 % | DIASTOLIC BLOOD PRESSURE: 67 MMHG | HEART RATE: 98 BPM

## 2021-05-26 VITALS
SYSTOLIC BLOOD PRESSURE: 167 MMHG | TEMPERATURE: 98.8 F | DIASTOLIC BLOOD PRESSURE: 81 MMHG | OXYGEN SATURATION: 96 % | HEART RATE: 88 BPM

## 2021-05-26 VITALS
SYSTOLIC BLOOD PRESSURE: 119 MMHG | DIASTOLIC BLOOD PRESSURE: 69 MMHG | TEMPERATURE: 97.8 F | OXYGEN SATURATION: 97 % | HEART RATE: 82 BPM

## 2021-05-31 NOTE — TELEPHONE ENCOUNTER
Call placed to Glenis to check in and see if she had gotten her biopsy scheduled as I would like to assist in scheduling her in a timely manner to see the oncologist. Asked that she call me back at [phone], so I may assist in scheduling the appointment.

## 2021-05-31 NOTE — PROGRESS NOTES
In Basket Message received for Medical Oncology Consult, referring physician Dr Femi Mendoza.  Placed call to Glenis to schedule appointment. Same scheduled for Tuesday August 27th 2019 at 10:00 am with Dr Kahn, with a nurse appt at 09:30 am. USPS mail sent to Glenis with introduction letter, MD bio card, [facility] intake form, medication and allergy list, Honoring Choices, consent to communicate, and appointment letter. Work up for pancreatic cancer has been done at [facility], and [facility].  [facility] information on Pancreatic Cancer and Nutrition information was also sent to Glenis.     Will work with medical records to ensure any needed records are collected. DARWIN was sent to patient on 8/8/2019 along with a form for past MD's. Glenis states she has sent it back in the mail to me on 8/13 will give to medical records when it arrives.     Explained that I would be Glenis's's nurse navigator.  I generally provide assistance with psycho social needs including but not limited to, financial assistance, obtaining education materials, transportation assistance, help with meals, community programs, and help finding support or support groups, getting connected with our psychotherapist when needed.  I am also here to help guide you through our system.      I am in the office Monday thru Friday. [phone]. I am in and out of my office throughout the day, If I do not answer my phone please leave me a message and I will get back to you as soon as possible. If you are ever unsure of who to call you can always contact me and I will help assist you in any way that I can.  Medication or symptom management needs typically go through our triage nurse who can be reached by calling the main clinic number. [phone]    Please do not hesitate to contact me if you have any questions or concerns.      Explained need to arrive at time given 09:30 am and complete the health history form and medication and allergy list  and bring both to appointment.

## 2021-05-31 NOTE — CONSULTS
[facility] Hematology and Oncology Consult Note    Patient: Glenis Pop  MRN: [id number]  Date of Service: 08/27/2019        Reason for Visit    I was consulted by Dr. Mendoza regarding pancreatic cancer.    Assessment/Plan    Ms. Glenis Pop is a 69 y.o. woman with a minimal past history and who is very active.  She presented with some mild abdominal discomfort and backache that developed over the last 2 months.  The CT scan of the abdomen and pelvis that was done on 8/1/2019 showed numerous liver mets, a 35 mm mass in the body of the pancreas, upper abdominal lymph nodes that are enlarged mildly and a left adrenal lesion that appears to be a metastasis.  She has had an ultrasound-guided core needle biopsy done from 1 of the liver mets on 8/15/2019 which showed a poorly differentiated adenocarcinoma consistent with a pancreatic primary.  I have gone through her past medical records from the [employer] system and also from the [employer] system.  I have reviewed the pathology report.  I have reviewed her labs and have personally reviewed the images of the CT scan of the abdomen and pelvis.    1.  I reviewed the images and the report of the CT scan of the abdomen and pelvis with the patient and her daughter.  I explained to her that this appears to be a cancer of the pancreatic body that has spread to the liver, lymph nodes and the left adrenal gland.  I described the pathology report to her.  I discussed with her that from the information that we have, we can conclude that she has a stage IV cancer.  Unfortunately this means that it is not curable.  What we can do is to treat it appropriately to try to get her as much life expectancy as possible and to preserve her quality of life.  The mainstay of treatment will need to be systemic treatment like chemotherapy.  I will need some more information to decide which will be the best chemotherapy regimen to go for, balancing side effects and effectiveness.  We also need to  look to see whether she may be a candidate for one of the newer treatment options like targeted therapies and immunotherapies.  She was agreeable to doing the appropriate work-up and was agreeable to considering the possibility of systemic treatment including chemotherapy.    2.  To do appropriate staging for the bronchitic cancer, we will obtain the following labs today: CBC differential platelets, CMP, CA-19-9 level and a CEA level.    3.  For staging we will also obtain a PET CT scan which may show us other sites of disease.  I think this would also clarify whether the left adrenal nodule is metastasis or not.    4.  She will need a Port-A-Cath for chemotherapy.  I explained to her what the Port-A-Cath is and how it will be useful.  She was agreeable.  I have asked for this to be scheduled through interventional radiology.    5.  I will request for Foundation one CDX testing to be done on the original biopsy sample in the [employer] system.  Expanded to the patient that this testing may give us some clues whether there is a possibility of using immunotherapy or other targeted therapies for the pancreatic cancer.  I explained to her that these treatments work only in a minority of patients in whom there are specific targets.  She was agreeable.    6.  There is a striking history of early breast cancer in 2 of her sisters.  As well as she is aware the family ancestry is only [place] and [place].  I will refer her to the genetic counselor to consider for germline genetic testing.  I explained to the patient that there is a small possibility that this may give her some new treatment options if she is found to have some specific gene mutation.  The bigger benefit will be for her family because if she has a problematic genetic mutation, there is a possibility that they may have inherited it.  She was agreeable.    7.  I discussed with her about the utility of having a healthcare directive in place.  She was agreeable to  having one filled.  She was given the form.  I asked her to bring it in to the clinic so that we can have it notarized and filed in the electronic medical record.    8.  Follow-up: Return to clinic with me in about a week's time after having the PET CT scan done.      ECOG Performance   ECOG Performance Status: 0  Distress Assessment           Problem List    1. Malignant neoplasm of body of pancreas (H)  Pathology Additional Testing    Ambulatory referral to Genetics    HM1(CBC and Differential)    Comprehensive Metabolic Panel    Carbohydrate Antigen 19-9 (CA 19-9)    CEA (Carcinoembryonic Antigen)    IR Port Placement 5+ Years    NM PET CT Skull to Mid Thigh    HM1 (CBC with Diff)   2. Liver metastases (H)  Pathology Additional Testing    CEA (Carcinoembryonic Antigen)    NM PET CT Skull to Mid Thigh           CC: Austin Mendoza MD      ______________________________________________________________________________      Staging History    Cancer Staging  Malignant neoplasm of body of pancreas (H)  Staging form: Exocrine Pancreas, AJCC 8th Edition  - Clinical stage from 8/27/2019: Stage IV (cT2, cN2, pM1) - Signed by Ashia Kahn MD on 8/27/2019      History    Ms. Glenis Pop is a 69-year-old woman who is here for the consultation with her daughter.    She says that she was feeling a discomfort in her upper abdomen for the last 2 months.  She says that she felt like there was a bubble there.  By the beginning of August she started having some backache.  With these complaints she went to see Dr. Mendoza.  Lab work showed a mild alkaline phosphatase elevation.  A CT scan of the abdomen and pelvis that was done on 8/1/2019 showed a 35 mm mass in the body of the pancreas, some mild upper abdominal lymph nodes, a 5 mm left adrenal nodule and numerous liver masses which appear to be metastases.  She had an ultrasound-guided core needle biopsy done on 8/15/2019 which showed a poorly differentiated  adenocarcinoma consistent with a pancreatic primary.    She says that she is in good physical shape.  She is physically active and goes walking every day.  She still takes care of all the work needed at home herself.  She is socially active also.    Right now she does not have any pain.  She says that the pain acts up towards the evening or night.  She says that the pain is mild.  It goes away with just taking a tablet of extra strength Tylenol.  She wonders whether it has got anything to do with the eating that she does not during the day but she says that if she has a meal she does not have pain after that.    She says that her appetite is okay.  She has lost may be 5 or 6 pounds of weight in the last 2 months.    For a long time she does see a bit of blood now and then when she brushes her teeth from her gums.  This is never been anything that she has been concerned about.    No fevers.  No night sweats.  No lumps or bumps elsewhere.  No unusual headaches.  No eyesight problems.  No mouth sores.  No swallowing difficulty.  No nausea or vomiting.  No shortness of breath.  No chest pain or cough.  No constipation and no diarrhea.  No blood in stool or black stools.  No vaginal discharge.  No difficulty with urination.  No skin rashes.  No numbness or tingling.  No motor weakness.    Please see below.  A 14 point review of system is otherwise completely negative.  Past History  Past Medical History:   Diagnosis Date     Endometriosis      Hemorrhoids      Past Surgical History:   Procedure Laterality Date     PERCUTANEOUS LIVER BIOPSY  08/15/2019     TOTAL ABDOMINAL HYSTERECTOMY W/ BILATERAL SALPINGOOPHORECTOMY  1980     Family History   Problem Relation Age of Onset     Valvular heart disease Mother      Rheumatic fever Mother      Lung cancer Father 70        smoker.     Breast cancer Sister 37     Breast cancer Sister 31     Aneurysm Sister         in the brain.     No Medical Problems Daughter      No Medical  Problems Daughter      Social History     Socioeconomic History     Marital status:      Spouse name: None     Number of children: None     Years of education: None     Highest education level: None   Occupational History     Occupation: Retired.     Comment: [employer] . Retired 2012.   Social Needs     Financial resource strain: None     Food insecurity:     Worry: None     Inability: None     Transportation needs:     Medical: None     Non-medical: None   Tobacco Use     Smoking status: Never Smoker     Smokeless tobacco: Never Used   Substance and Sexual Activity     Alcohol use: Never     Frequency: Never     Drug use: Never     Sexual activity: None   Lifestyle     Physical activity:     Days per week: None     Minutes per session: None     Stress: None   Relationships     Social connections:     Talks on phone: None     Gets together: None     Attends Worship service: None     Active member of club or organization: None     Attends meetings of clubs or organizations: None     Relationship status: None     Intimate partner violence:     Fear of current or ex partner: None     Emotionally abused: None     Physically abused: None     Forced sexual activity: None   Other Topics Concern     None   Social History Narrative     None     Allergies    Allergies   Allergen Reactions     Amoxicillin Hives       Review of Systems    General  General (WDL): All general elements are within defined limits  ENT  ENT (WDL): All ENT elements are within defined limits  Respiratory  Respiratory (WDL): All respiratory elements are within defined limits  Cardiovascular  Cardiovascular (WDL): All cardiovascular elements are within defined limits  Endocrine  Endocrine (WDL): All endocrine elements are within defined limits  Gastrointestinal  Gastrointestinal (WDL): All gastrointestinal elements are within defined limits  Musculoskeletal  Musculoskeletal (WDL): Exceptions to WDL  Back Pain: Yes - Recent (Less than 3  "months)(only at night, mostly bilat flanks)  Neurological  Neurological (WDL): All neurological elements are within defined limits  Dominant Hand: Left  Psychological/Emotional  Psychological/Emotional (WDL): All psychological/emotional elements are within defined limits  Hematological/Lymphatic  Hematological/Lymphatic (WDL): Exceptions to WDL  Bleeding gums: Yes - Recent (Less than 3 months)  Dermatological  Dermatologic (WDL): All dermatological elements are within defined limits  Genitourinary/Reproductive  Genitourinary/Reproductive (WDL): All genitourinary/reproductive elements are within defined limits  Reproductive (Females only)     Pain  Currently in Pain: No/denies    Physical Exam    Recent Vitals 8/27/2019   Height 5' 7\"   Weight 154 lbs 11 oz   BSA (m2) 1.82 m2   /78   Pulse 101   Temp 98.1   Temp src 1   SpO2 95       GENERAL: Alert and oriented to time place and person. Seated comfortably. In no distress.  She looks well overall.  A little bit anxious.    HEAD: Atraumatic and normocephalic.    EYES: DAMON, EOMI.  No pallor.  No icterus.    Oral cavity: no mucosal lesion or tonsillar enlargement.    NECK: supple. JVP normal.  No thyroid enlargement.    LYMPH NODES: No palpable, cervical, axillary or inguinal lymphadenopathy.    CHEST: clear to auscultation bilaterally.  Resonant to percussion throughout bilaterally.  Symmetrical breath movements bilaterally.    CVS: S1 and S2 are heard. Regular rate and rhythm.  No murmur or gallop or rub heard.  No peripheral edema.    ABDOMEN: Soft. Not tender. Not distended.  No palpable splenomegaly.  The left lobe of the liver appears slightly enlarged and firm and is palpable in the epigastrium.  No other mass palpable.  Bowel sounds heard.    EXTREMITIES: Warm.    SKIN: no rash, or bruising or purpura.  Has a full head of hair.      Lab Results    Lab Requisition on 08/12/2019   Component Date Value Ref Range Status     INR 08/12/2019 1.07  0.90 - 1.10 " Final     PTT 08/12/2019 25  24 - 37 seconds Final     Platelets 08/12/2019 273  140 - 440 thou/uL Final   Lab Requisition on 08/08/2019   Component Date Value Ref Range Status     Sodium 08/08/2019 139  136 - 145 mmol/L Final     Potassium 08/08/2019 4.3  3.5 - 5.0 mmol/L Final     Chloride 08/08/2019 102  98 - 107 mmol/L Final     CO2 08/08/2019 27  22 - 31 mmol/L Final     Anion Gap, Calculation 08/08/2019 10  5 - 18 mmol/L Final     Glucose 08/08/2019 124  70 - 125 mg/dL Final     BUN 08/08/2019 11  8 - 22 mg/dL Final     Creatinine 08/08/2019 0.73  0.60 - 1.10 mg/dL Final     GFR MDRD Af Amer 08/08/2019 >60  >60 mL/min/1.73m2 Final     GFR MDRD Non Af Amer 08/08/2019 >60  >60 mL/min/1.73m2 Final     Bilirubin, Total 08/08/2019 0.5  0.0 - 1.0 mg/dL Final     Calcium 08/08/2019 10.5  8.5 - 10.5 mg/dL Final     Protein, Total 08/08/2019 8.3* 6.0 - 8.0 g/dL Final     Albumin 08/08/2019 3.8  3.5 - 5.0 g/dL Final     Alkaline Phosphatase 08/08/2019 159* 45 - 120 U/L Final     AST 08/08/2019 38  0 - 40 U/L Final     ALT 08/08/2019 23  0 - 45 U/L Final     Lipase 08/08/2019 9  0 - 52 U/L Final     Pathology report from the [facility] system accessed through care everywhere.    Pathology Report                                  Case: [id number]                                  Authorizing Provider:  Hood Gottlieb MD    Collected:           08/15/2019 0930              Ordering Location:     [facility] CENTRAL LAB            Received:            08/15/2019 1625              Pathologist:           Ta Butts IV, MD                                                                       Specimen:    Liver                                                                                      Final Diagnosis A) LIVER, MASS, BIOPSY:  1.  Metastatic poorly differentiated adenocarcinoma, consistent with a pancreatic primary   2.  Background  "nonneoplastic liver shows mild steatosis, no evidence of steatohepatitis or fibrosis  3.  See comment     Comment A) The liver biopsy shows a poorly differentiated adenocarcinoma, which while not entirely specific, is consistent with a metastatic lesion from a pancreas primary.  If another primary site, beyond the pancreas, is suspected clinically, additional immunohistochemical studies could be performed.    Please contact us with any questions ([facility] GI pathology service [phone] or Dr. Roberto Butts directly at [phone]). Dr. Butts discussed the findings with Dr. Fraga ([facility] radiologist) on 8/16/2019.    Case seen in consultation with Dr. Marshall.     Clinical Information Ms. Pop is a 69 y.o. who has a pancreatic mass with suspected multiple liver metastases, now undergoes liver biopsy. Review of the [facility] medical record shows no previous pathology, and no germane clinical notes. Per Dr. Butts's discussion with Dr. Fraga, the imaging findings are characteristic of a pancreatic primary with metastatic disease to the liver.     Gross Description A) Labeled with the patient's name and \"liver\" are two tan liver needle core biopsies measuring 1 cm and 1.8 cm in length x 0.1 cm in diameter. The specimen is wrapped and submitted entirely in cassette A1.    Time and date in formalin: 0930 on 8/15/2019     JOSE:caw 8/15/2019     Microscopic Description The final diagnosis is based on microscopic examination of appropriate sections of all specimens.     Additional Information Interpreted at [facility]  (Central Lab, [facility], [facility],   [facility], [facility], [facility], [facility])     Specimen Collected on   Other - Liver 8/15/2019 9:30 AM           Imaging Results    CT scan of the abdomen and pelvis, dated 8/1/2019 from [place] radiology reviewed.      Signed by: Ashia Kahn MD  "

## 2021-05-31 NOTE — TELEPHONE ENCOUNTER
Call placed to Glenis to help schedule an appointment with medical oncology. She states she talked with Dr Mendoza yesterday and is waiting for a call to schedule a biopsy so that she knows for sure what she has.   Told her I was calling to schedule her with the medical oncologist, but until we know when her biopsy is we should wait to schedule her appointment. I have asked Glenis to call me as soon as she has her biopsy scheduled so that I can get her scheduled to see an oncologist. Glenis states she understands she is going to see Dr Banuelos, I asked what clinic she would like to go to? [facility], I informed her that Dr Banuelos does not go to the  clinic anymore, and has not for about 4 years. I told her that the message I got from Dr Banuelos was that she could be scheduled with any of the oncologist's. I read her the message, she was under the impression she would be seeing dr Banuelos because Dr Mendoza had spoken to Dr Banuelos about her. I would be happy to let Dr Banuelos know which oncologist I schedule her with so that he can give the other doctor any information that Dr Banuelos thinks would be beneficial. She is happy with that, I also let her know that our doctor's often speak to each other and collaborate about patient's and their care. Glenis states she is very reassured.     I have sent out her DARWIN to her along with the form to list her past MD's. I have asked her to fill it out and return to me as soon as possible so that we can gather her records for her appointment.

## 2021-05-31 NOTE — TELEPHONE ENCOUNTER
09:00; Left message for Glenis that I was still working on securing an appointment for her.   12:20; call laced to Glenis that I had secured an appointment for Glenis on August 27th with Dr Kahn. I asked her to please call me at [phone] so I may discuss appointment with her and get her the information she needs for her appointment.

## 2021-05-31 NOTE — PRE-PROCEDURE
Risks and benefits: Risks, benefits and alternatives were discussed  Consent given by: patient  Expected level of sedation: moderate  ASA Class: Class 3- Severe systemic disease, definite functional limitations  Mallampati: Grade 1- soft palate, uvula, tonsillar pillars, and posterior pharyngeal wall visible  Patient states understanding of procedure being performed: Yes  Patient's understanding of procedure matches consent: Yes  Procedure consent matches procedure scheduled: Yes  Appropriately NPO: yes  Lungs: lungs clear with good breath sounds bilaterally  Heart: normal heart sounds and rate  History & Physical reviewed: History and physical reviewed and no updates needed  Statement of review: I have reviewed the lab findings, diagnostic data, medications, and the plan for sedation    Christina Douglas CNP  Interventional Radiology

## 2021-05-31 NOTE — PROGRESS NOTES
Patient is here for consultation of pancreatic cancer.  Accompanied by daughter, Mona. Yesenia Thornton RN

## 2021-06-01 NOTE — PROGRESS NOTES
[facility] Hematology and Oncology Progress Note    Patient: Glenis Pop  MRN: [id number]  Date of Service: 09/06/2019        Reason for Visit    Follow-up regarding metastatic pancreas cancer.    Assessment and Plan  Cancer Staging  Malignant neoplasm of body of pancreas (H)  Staging form: Exocrine Pancreas, AJCC 8th Edition  - Clinical stage from 8/27/2019: Stage IV (cT2, cN2, pM1) - Signed by Ashia Kahn MD on 8/27/2019      ECOG Performance   ECOG Performance Status: 0    Distress Assessment  Distress Assessment Score: 4: Worry about the treatment for pancreas cancer.  Please see the discussion below.    Pain   : None.    Ms. Glenis Pop is a 69 y.o. woman who presents with some complaints of mild abdominal discomfort and backache over 2 months.  CT scan of the abdomen and pelvis that was done on 8/1/2019 showed numerous liver mets, a 35 mm mass in the body of the pancreas, upper abdominal lymph nodes that were enlarged and a left renal lesion that appears to be metastasis.  She had an ultrasound-guided core needle biopsy from 1 of the liver mets on 8/15/2019 which showed a poorly differentiated adenocarcinoma consistent with a pancreatic primary.     1.  I reviewed the results of the work-up so far with the patient.  Her blood counts are essentially normal.  Her metabolic panel showed a normal kidney function.  There was some liver enzyme elevation consistent with liver infiltration by metastasis.  Bilirubin was normal.  The CEA level was slightly elevated at 4.4.  The CA-19-9 was grossly elevated at more than 22,000.  I reviewed the results with her.    2.  She had a PET CT scan done on 8/30/2019.  I reviewed the images and the report with her.  We see that the mass in the pancreas is PET positive.  Numerous metastasis are seen in the liver, lungs and abdominal lymph nodes.  The left abdominal lesion is not lighting up so it appears to be an adenoma.  The unexpected finding is a PET positive nodule in the  proximal ascending colon.  I discussed with her that this could be an unusual site of metastasis for the pancreas cancer.  This could also turn out to be an advanced colon polyp or even a separate colon cancer.    3.  We discussed about what to do about the colonic lesion.  One option would be to go for a colonoscopy to see what it is and possibly have a polypectomy.  She is not very enthusiastic about that.  Finally we decided that if it is remaining asymptomatic we will leave it alone since she already has widely metastatic pancreas cancer.  Most of the chemotherapy is for pancreas cancer should be effective for colon cancer also in case this is a form of cancer.  She is comfortable with the plan.    4.  She has not made the appointment with the genetic counselor.  She felt that she needed more information about why she is already a genetic counselor.  I explained to her that the purpose is twofold.  First if we find that she has some like a BRCA mutation we can consider treatment with a pop inhibitor down the line if she has a good response to the initial chemotherapy.  Another reason is that if she is found to have a germline mutation that is pathogenic, that would be helpful for her family since they can take measures to detect the cancer earlier or prevent it.  She then agreed to make an appointment with the genetic counselor.  I told her that it is okay for her to make that appointment in the coming few weeks.    5.  We then discussed about treatment of the metastatic pancreas cancer.  She understands very clearly that treatment will be only palliative and not curative.  She tells me that she is very much afraid that chemotherapy will cause side effects which will incapacitate her.  She says that she has a good quality of life and strength now.  She would like to preserve it for as long as possible.  She really dreads the situation where she is going to be bedbound.  I discussed with her that finally the  decision is hers.  We have data from thousands of people who were enrolled on clinical trials to guide us.  There is certainly be side effects from the chemotherapy.  However in the trials we have seen that patients who are on chemotherapy live longer and overall have a better quality of life than patients with metastatic pancreas cancer who decided not to take any chemotherapy.  I discussed with her that while she may be feeling good now if the cancer is allowed to progress without any treatment, I think in just a few months she rates the point where she is weak and bedbound.  Her daughter is clearly in favor of going for chemotherapy.  The patient is still into mind.  She would like to think a little bit more before she makes a final decision.  Thus she has not signed a consent form today.  However she was okay for me to start arranging the day for the initial chemotherapy so she does not lose more time.    6.  I discussed with her that there are 2 frontline chemotherapies that we can consider for metastatic pancreas cancer.  One is called FOLFIRINOX and the other is gemcitabine and Abraxane.  I think she is strong enough for FOLFIRINOX.  I think she is likely to have a better longer term response from FOLFIRINOX.  Thus I recommended the FOLFIRINOX regimen for her.  I discussed with her that when we initially start FOLFIRINOX we usually start at 80% dose.  If she tolerates it well then we go to 90% dose for the second cycle and then 100% dose for the third cycle if she tolerates it well.  She will also have Neulasta support.    I described to her the typical day of chemotherapy with FOLFIRINOX.  She will have labs drawn to check blood counts, kidney and liver function and will be seen by me or the nurse practitioner.  If she is doing well then she will go to chemotherapy.  I explained to her that she will receive most of the chemotherapy in the infusion room IV through the port.  After that she will have the  fluorouracil pump connected to her which she will need to wear for 2 days.  Each cycle is 14 days long.  After 4 cycles (approximately 2 months of treatment) we will repeat a PET CT scan or CT scan to see whether she is responding to chemotherapy.  We will also be checking the CA-19-9 level with each cycle.  If she is responding, will continue with chemotherapy.  Down the line if the cancer starts growing in spite of chemotherapy or if she has unacceptable side effects then we will need to reassess what we are doing.  We can consider using a different chemotherapy regimen or she can decide to stop chemotherapy at that point.  At any point if she says that she does not want to continue with chemotherapy we will honor her wishes.    7. I discussed with Glenis Pop about the risks of chemotherapy.  We discussed about the reduction in blood counts. Anemia can cause tiredness, a low platelet count can lead to bleeding problems. A blood transfusion may be needed for anemia or low platelet count. A low white blood count puts a person at risk for infections which may even be life threatening. If there is a fever of 100.5 F or above the Cancer clinic needs to be called immediately day or night as inpatient admission and antibiotics may be needed.    Chemotherapy may cause body aches. It may cause hair loss which can even be permanent.  Usually FOLFIRINOX does not cause complete hair loss but it can cause thinning of hair.  Chemotherapy may cause problems with oral ulcers or pain on swallowing. It may cause nausea and vomiting but and we use antinausea medications to control the nausea.  Fluorouracil-containing chemotherapy can occasionally cause coronary vasospasm which can effectively cause a heart attack.  If she has chest pain she needs to call 911 and go to the ER immediately.    It may cause diarrhea or constipation.  Chemotherapy may cause toxicity to the liver or kidneys.      Chemotherapy may cause neuropathy  causing numbness and tingling in fingers and toes and unsteadiness on walking. Chemotherapy doses may need to be modified for this problem.  Occasionally chemotherapy may cause problems for the brain. Some people experience problems with memory. Rarely there can be serious brain damage.    Occasionally chemotherapy can cause damage to the bone marrow after some time with reduced blood counts- called myelodysplasia. Rarely chemotherapy can cause a second cancer years later, like a leukemia.    Thus chemotherapy can have serious and even life threatening side effects. Some people can have side effects that are different from what we discussed as people can react differently to chemotherapy- what we have discussed is not an exhaustive list. However chemotherapy is being recommended as the expected benefits outweigh the risks.    I have given her printed information about FOLFIRINOX chemotherapy to read.    8.  We finally decided that I will ask for her to be scheduled to start FOLFIRINOX chemotherapy next week after having chemotherapy education with the nurses.  If she decides not to have chemotherapy she will call and let us know.  If she decides not to have chemotherapy, then I would recommend hospice.    9.  She has not signed consent for chemotherapy today.  If she decides that she is likely going to proceed with the chemotherapy she will need to sign consent on the first day of chemotherapy.    10.  Chemotherapy orders have been initiated.  I have sent prescriptions for antinausea medications and EMLA cream to the pharmacy.  I explained to her how to take the antinausea medications.  I emphasized to her that nausea will be well controlled if she takes the antinausea medications early when she just feels a little bit of nausea.  If she allows the nausea to build up then it becomes difficult to control.  She voiced understanding.    11.  Return to clinic with MD or NP in a week's time with labs according to  treatment plan to start FOLFIRINOX chemotherapy.    Time spend >45 minutes face to face with the patient. More than 50 % in counseling and coordination of care.      Problem List    1. Malignant neoplasm of body of pancreas (H)  Education (Chemo Class)    prochlorperazine (COMPAZINE) 10 MG tablet    dexAMETHasone (DECADRON) 4 MG tablet    lidocaine-prilocaine (EMLA) cream    CC OFFICE VISIT LONG    Infusion Appointment    ondansetron (ZOFRAN) 8 MG tablet   2. Liver metastases (H)     3. Lesion of colon     4. Encounter for antineoplastic chemotherapy  Education (Chemo Class)    prochlorperazine (COMPAZINE) 10 MG tablet    dexAMETHasone (DECADRON) 4 MG tablet    lidocaine-prilocaine (EMLA) cream    CC OFFICE VISIT LONG    Infusion Appointment    ondansetron (ZOFRAN) 8 MG tablet        CC: Austin Mendoza MD    ______________________________________________________________________________    History of Present Illness    Ms. Glenis Pop is here for follow-up with her daughter.    She has had a Port-A-Cath placed.  She says that site is healing well.  Only some mild tenderness.    She says that she does not feel much different from when she saw me 2 weeks ago.  She says that she is eating small meals.  She has a feeling of feeling full very easily.  Apart from that she really has nothing to report.    She says that her energy is good.  She is still taking care of her grandchildren and taking them to school etc.    She is apprehensive about what is coming up with a treatment for the pancreas cancer.  She is considering chemotherapy but she is very worried about quality of life once she is on chemotherapy.    She says that her chronic backache is slightly better.  It is episodic and at this time she does not have any pain at all.    Please see below.  A 14 point review of system is otherwise completely negative.    Pain Status  Currently in Pain: No/denies    Review of  "Systems    Constitutional  Constitutional (WDL): All constitutional elements are within defined limits  Neurosensory  Neurosensory (WDL): All neurosensory elements are within defined limits  Cardiovascular  Cardiovascular (WDL): Exceptions to WDL  Palpitations: None  Edema: Yes  Edema Trunk: Swelling or obscuration of anatomic architecture on close inspection  Phlebitis: None  Superficial thrombophlebitis: None  Pulmonary  Respiratory (WDL): Within Defined Limits  Gastrointestinal  Gastrointestinal (WDL): Exceptions to WDL  Anorexia: Loss of appetite without alteration in eating habits(early feeling of fullness)  Constipation: None  Diarrhea: None  Dysphagia: None  Esophagitis: None  Nausea: None  Pharyngitis: None  Vomiting: None  Dysgeusia: None  Dry Mouth: None  Genitourinary  Genitourinary (WDL): All genitourinary elements are within defined limits  Integumentary  Integumentary (WDL): Exceptions to WDL(Healing Rt upper chest port a cath)  Alopecia: None  Rash Maculo-Papular: None  Pruritus: None  Urticaria: None  Palmar-Plantar Erythrodysesthesia Syndrome: None  Flushing: None  Patient Coping  Patient Coping: Open/discussion;Anxiety  Distress Assessment  Distress Assessment Score: 4  Accompanied by  Accompanied by: Family Member    Past History  Past Medical History:   Diagnosis Date     Endometriosis      Hemorrhoids      Pancreatic cancer (H)          Past Surgical History:   Procedure Laterality Date     IR PORT PLACEMENT >5 YEARS  9/3/2019    Right IJ port.     PERCUTANEOUS LIVER BIOPSY  08/15/2019     TOTAL ABDOMINAL HYSTERECTOMY W/ BILATERAL SALPINGOOPHORECTOMY  1980       Physical Exam    Recent Vitals 9/6/2019   Height 5' 7\"   Weight 153 lbs 5 oz   BSA (m2) 1.81 m2   /82   Pulse 97   Temp -   Temp src -   SpO2 96   Some recent data might be hidden       GENERAL: Alert and oriented to time place and person. Seated comfortably. In no distress.  She looks slightly anxious.  Otherwise she looks " okay.    HEAD: Atraumatic and normocephalic.    EYES: DAMON, EOMI.  No pallor.  No icterus.    Oral cavity: no mucosal lesion or tonsillar enlargement.    NECK: supple. JVP normal.  No thyroid enlargement.    LYMPH NODES: No palpable, cervical, axillary or inguinal lymphadenopathy.    CHEST: clear to auscultation bilaterally.  Symmetrical breath movements bilaterally.  Port-A-Cath over the right upper chest looks unremarkable.    CVS: S1 and S2 are heard. Regular rate and rhythm.  No murmur or gallop or rub heard.  No peripheral edema.    ABDOMEN: Soft. Not tender.  Mildly distended?  I think she has some palpable hepatomegaly in the epigastrium.  No other mass palpable.  Bowel sounds heard.    EXTREMITIES: Warm.    SKIN: no rash, or bruising or purpura.  Has a full head of hair.      Lab Results    Hospital Outpatient Visit on 08/30/2019   Component Date Value Ref Range Status     Glucose 08/30/2019 129  70 - 139 mg/dL Final   Office Visit on 08/27/2019   Component Date Value Ref Range Status     Sodium 08/27/2019 137  136 - 145 mmol/L Final     Potassium 08/27/2019 4.4  3.5 - 5.0 mmol/L Final     Chloride 08/27/2019 99  98 - 107 mmol/L Final     CO2 08/27/2019 27  22 - 31 mmol/L Final     Anion Gap, Calculation 08/27/2019 11  5 - 18 mmol/L Final     Glucose 08/27/2019 122  70 - 125 mg/dL Final     BUN 08/27/2019 9  8 - 22 mg/dL Final     Creatinine 08/27/2019 0.70  0.60 - 1.10 mg/dL Final     GFR MDRD Af Amer 08/27/2019 >60  >60 mL/min/1.73m2 Final     GFR MDRD Non Af Amer 08/27/2019 >60  >60 mL/min/1.73m2 Final     Bilirubin, Total 08/27/2019 0.6  0.0 - 1.0 mg/dL Final     Calcium 08/27/2019 10.3  8.5 - 10.5 mg/dL Final     Protein, Total 08/27/2019 8.7* 6.0 - 8.0 g/dL Final     Albumin 08/27/2019 3.5  3.5 - 5.0 g/dL Final     Alkaline Phosphatase 08/27/2019 268* 45 - 120 U/L Final     AST 08/27/2019 59* 0 - 40 U/L Final     ALT 08/27/2019 41  0 - 45 U/L Final     Cancer Antigen-GI (CA 19-9) 08/27/2019 56097* 0 -  37 U/mL Final     CEA 08/27/2019 4.4* 0.0 - 3.0 ng/mL Final     WBC 08/27/2019 9.7  4.0 - 11.0 thou/uL Final     RBC 08/27/2019 4.28  3.80 - 5.40 mill/uL Final     Hemoglobin 08/27/2019 12.8  12.0 - 16.0 g/dL Final     Hematocrit 08/27/2019 39.0  35.0 - 47.0 % Final     MCV 08/27/2019 91  80 - 100 fL Final     MCH 08/27/2019 29.9  27.0 - 34.0 pg Final     MCHC 08/27/2019 32.8  32.0 - 36.0 g/dL Final     RDW 08/27/2019 12.6  11.0 - 14.5 % Final     Platelets 08/27/2019 258  140 - 440 thou/uL Final     MPV 08/27/2019 8.5  8.5 - 12.5 fL Final     Neutrophils % 08/27/2019 75* 50 - 70 % Final     Lymphocytes % 08/27/2019 13* 20 - 40 % Final     Monocytes % 08/27/2019 7  2 - 10 % Final     Eosinophils % 08/27/2019 4  0 - 6 % Final     Basophils % 08/27/2019 1  0 - 2 % Final     Neutrophils Absolute 08/27/2019 7.3  2.0 - 7.7 thou/uL Final     Lymphocytes Absolute 08/27/2019 1.2  0.8 - 4.4 thou/uL Final     Monocytes Absolute 08/27/2019 0.7  0.0 - 0.9 thou/uL Final     Eosinophils Absolute 08/27/2019 0.4  0.0 - 0.4 thou/uL Final     Basophils Absolute 08/27/2019 0.1  0.0 - 0.2 thou/uL Final           Imaging    Ir Port Placement 5+ Years    Result Date: 9/3/2019  LOCATION: [facility] DATE: 9/3/2019 PROCEDURE: IMPLANTABLE VENOUS PORT PLACEMENT 1.  Implantable venous access port placement. 2.  Ultrasound guidance for vascular access. A permanent image was stored. 3.  Fluoroscopic guidance for central venous access device placement. INTERVENTIONAL RADIOLOGIST: Jordyn Gonzalez MD INDICATION: Pancreatic cancer, plan for port placement. CONSENT: The risks, benefits and alternatives of the procedure were discussed with the patient or representative in detail. All questions were answered. Informed consent was given to proceed with the procedure. MODERATE SEDATION: Versed 2 mg IV; Fentanyl 100 mcg IV. During the time out, immediately prior to the administration of medications, the patient was reassessed for adequacy to  receive conscious sedation.  Under physician supervision, Versed and fentanyl were administered for moderate sedation. Pulse oximetry, heart rate and blood pressure were continuously monitored by an independent trained observer. The physician spent 30 minutes of face-to-face sedation time with the patient. CONTRAST: None. ANTIBIOTICS: Clindamycin 900 mg ADDITIONAL MEDICATIONS: None. FLUOROSCOPIC TIME: 0.2 minutes. RADIATION DOSE: Air Kerma: 1 mGy. COMPLICATIONS: No immediate complications. UNIVERSAL PROTOCOL: The operative site was marked and any prior imaging was reviewed. Required items including blood products, implants, devices and special equipment was made available. Patient identity was confirmed either verbally, with demographic information, hospital assigned identification or other identification markers. A timeout was performed immediately prior to the procedure. STERILE BARRIER TECHNIQUE: Maximum sterile barrier technique was used. Cutaneous antisepsis was performed at the operative site with application of 2% chlorhexidine and large sterile drape. Prior to the procedure, the  and assistant performed hand hygiene and wore hat, mask, sterile gown, and sterile gloves during the entire procedure. PROCEDURE:  Using real-time ultrasound guidance the right internal jugular vein was accessed. A subcutaneous pocket was created and irrigated with sterile normal saline. The catheter tubing was tunneled in an antegrade fashion from the port pocket to the dermatotomy  site. Over a guidewire, a peel-away sheath was advanced with fluoroscopic monitoring. Through the peel-away sheath, the catheter was advanced until the tip was at the cavoatrial junction. Positioning of the distal tip was confirmed with a radiograph from the in room fluoroscopic unit. The catheter was cut to length and attached firmly to the port. The port pocket incision was closed with layered absorbable suture and surgical glue. The  dermatotomy site was closed with surgical glue. FINDINGS: Ultrasound shows an anechoic and compressible jugular vein. At the completion of the study, the port tip lies at the cavoatrial junction.     Successful power-injectable venous port placement. CPT codes for physician reference only: 31222 [id number] x 1     Nm Pet Ct Skull To Mid Thigh    Result Date: 8/30/2019  EXAM: NM PET CT SKULL TO MID THIGH LOCATION: [facility] DATE/TIME: 8/30/2019 9:13 AM INDICATION: Pancreatic adenocarcinoma, staging. Malignant neoplasm of body of pancreas. Liver metastases. Initial treatment strategy. COMPARISON: CT abdomen pelvis 08/01/2019 reviewed. TECHNIQUE: Serum glucose level 129 mg/dL. One hour post intravenous administration of 8.1 mCi F-18 FDG, PET imaging was performed from the skull base to the mid thighs utilizing attenuation correction with concurrent axial CT and PET/CT image fusion. Dose reduction techniques were used. FINDINGS: Mass in the pancreatic head, as depicted on comparison CT estimated at 2.8 x 3.9 cm, demonstrates marked uptake (SUVmax 8.6), consistent with malignancy. Associated moderate upstream pancreatic parenchymal atrophy and duct dilatation. Numerous mildly enlarged FDG avid upper abdominal gastrohepatic, peripancreatic, sheldon hepatic and retroperitoneal lymph nodes. A representative sheldon hepatic node measures 1.4 x 2.4 cm associated with moderate uptake (SUVmax 4.0). Numerous FDG avid masses throughout the liver consistent with metastases, including a mass in the posterior right hepatic lobe measuring 3.1 cm associated with moderate uptake (SUVmax 5.8). Numerous scattered small bilateral pulmonary nodules suspicious for pulmonary metastases,  some of the larger of which demonstrate mild FDG activity such as a 0.8 x 1.3 cm nodule in the left lower lobe with SUVmax 2.5. FDG avid subpleural nodule posteromedial right lower chest consistent with a metastasis. No suspicious focal  skeletal uptake. Diffuse opacification hypoplastic right maxillary sinus with surrounding periosteal thickening and low-level uptake suggesting chronic sinusitis. FDG avid nodule in the proximal ascending colon measuring 1.2 cm associated with marked uptake (SUVmax 18.1). A hypodense circumscribed nodule in the left adrenal demonstrates no FDG activity, likely benign adenoma. Moderate atherosclerotic calcifications including the coronary arteries. Tiny gallstones and/or sludge. Tiny nonobstructing stone left kidney. Few small left renal cysts. Tiny fat-containing umbilical hernia. Mild colonic diverticulosis. Hysterectomy. Mild scattered degenerative changes in the spine. Benign vertebral body hemangiomas T9 and T12.     CONCLUSION: 1. Metastatic pancreatic cancer with primary mass in the pancreatic head and metastases involving the liver, lungs and abdominal lymph nodes. 2. Left adrenal adenoma. 3. Chronic right maxillary sinusitis. 4. FDG avid nodule proximal ascending colon, likely an incidental colonic adenomatous neoplasm, although likely of secondary concern in this patient with metastatic pancreatic cancer.        Signed by: Ashia Kahn MD

## 2021-06-01 NOTE — PROGRESS NOTES
Pt presented for labs, chemo ed class, provider visit and poss chemo for pancreas cancer with liver mets.Carli Kerr RN

## 2021-06-01 NOTE — PROGRESS NOTES
[facility] Hematology and Oncology Progress Note    Patient: Glenis Pop  MRN: [id number]  Date of Service: 09/25/2019        Reason for Visit    Follow-up regarding metastatic pancreas cancer.    Assessment and Plan  Cancer Staging  Malignant neoplasm of body of pancreas (H)  Staging form: Exocrine Pancreas, AJCC 8th Edition  - Clinical stage from 8/27/2019: Stage IV (cT2, cN2, pM1) - Signed by Ashia Kahn MD on 8/27/2019      ECOG Performance   ECOG Performance Status: 0    Distress Assessment   : Concerns about how she is doing with treatment.    Pain   : Very mild pain in the epigastrium.  She feels that the Tylenol is enough for pain control.    Ms. Glenis Pop is a 69 y.o. woman who presents with some complaints of mild abdominal discomfort and backache over 2 months.  CT scan of the abdomen and pelvis that was done on 8/1/2019 showed numerous liver mets, a 35 mm mass in the body of the pancreas, upper abdominal lymph nodes that were enlarged and a left renal lesion that appears to be metastasis.  She had an ultrasound-guided core needle biopsy from 1 of the liver mets on 8/15/2019 which showed a poorly differentiated adenocarcinoma consistent with a pancreatic primary.   The CEA level was slightly elevated at 4.4.  The CA-19-9 was grossly elevated at more than 22,000.  I reviewed the results with her.  The PET CT scan done on 8/30/2019showed the mass in the pancreas that is PET positive.  Numerous metastases are seen in the liver, lungs and abdominal lymph nodes.  The left abdominal lesion is not lighting up so it appears to be an adenoma.  The unexpected finding is a PET positive nodule in the proximal ascending colon.This could be an unusual site of metastasis for the pancreas cancer.  This could also turn out to be an advanced colon polyp or even a separate colon cancer.  We decided to watch it in the subsequent imaging studies.    She started chemotherapy with FOLFIRINOX on 9/11/2019.  The first  cycle was given at 80% dose and with Neulasta support.      1.  Overall she appears to have tolerated the first cycle of FOLFIRINOX chemotherapy at 80% dose fairly well.  She feels ready to proceed with cycle #2 of chemotherapy.  She is okay with the plan to increase the dose to 90%.  Labs from today were reviewed.  Blood counts are quite stable compared to when she started chemotherapy.  In fact hemoglobin is slightly better than when we started chemotherapy at 11.8.  WBC count and pill count are normal.  Her metabolic panel is also stable.  Kidney function is normal.  LFTs still show significant alkaline phosphatase elevation.  Total protein and albumin are slightly low.  I think it is okay to proceed with chemotherapy today.  We will go with FOLFIRINOX at 90% dose.  She will also receive Neulasta again for support.    2.  The epigastric pain and back pain came down with chemotherapy.  I suspect this means that the cancer is responding to chemotherapy.  However she has a small amount of pain in the epigastrium still left.  She feels that Tylenol alone is enough for the pain control.    3.  She has lost about 6 pounds of weight since starting treatment.  It appears that she has been trying to avoid any sugars or fatty food because she read somewhere that is what she should do when she has been getting problems.  I discussed with her that she does not really have any exocrine pancreatic insufficiency that we are aware of.  Thus I think avoiding sugar and fat to a great extent may not be in her best interest.  I have given her some printed suggestions about how to eat to avoid weight loss.  She voiced understanding.    4.  She was offered a flu vaccine today which she declined.    5.  Overall plan is to continue with FOLFIRINOX chemotherapy and Neulasta support.  If she is tolerating this cycle well then we can aim to go 200% dose with the next cycle.  After 4 cycles of chemotherapy we should repeat a PET CT  scan.    6.  Return to clinic with MD or NP in 2 weeks with labs according to treatment plan for cycle #3 of chemotherapy.    Time spend >25 minutes face to face with the patient. More than 50 % in counseling and coordination of care.      Problem List    1. Malignant neoplasm of body of pancreas (H)     2. Liver metastases (H)     3. Encounter for antineoplastic chemotherapy          CC: Austin Mendoza MD    ______________________________________________________________________________    History of Present Illness    Ms. Glenis Pop he is here for follow-up with her daughter.    She feels that she did okay with the first cycle of chemotherapy.  She did feel tired and took a few naps.  However in the last few days she feels that she has recovered well.  She says that the epigastric pain that she had came down nicely with chemotherapy.  The back pain went away.  In the last couple of days there is a slight pain that she feels in the upper abdomen.  She says that the pain is only at the level where she has to take Tylenol for it.    She has been very careful about her diet avoiding all fatty food.  With these changes she has lost about 6 pounds of weight.    She says that nausea and vomiting has not been a problem at all with the chemotherapy.  She also did not experience any cold sensitive neuropathy so far.    No fevers.  No night sweats.  No lumps or bumps anywhere.  No unusual headaches.  No eyesight problems.  No mouth sores.  No swallowing difficulty.  No shortness of breath.  No chest pain or cough.  No constipation and no diarrhea.  No blood in stool or black stools.  No difficulty with urination.  No skin rashes.  No numbness or tingling.    Please see below.  A 14 point review of system is otherwise completely negative.      Pain Status  Currently in Pain: No/denies    Review of Systems    Constitutional  Constitutional (WDL): Exceptions to WDL  Fatigue: Fatigue relieved by rest(first days  after tx)  Fever: None  Chills: None  Weight Gain: None  Weight Loss: to <10% from baseline, intervention not indicated(6#)  Neurosensory  Neurosensory (WDL): All neurosensory elements are within defined limits  Cardiovascular  Cardiovascular (WDL): All cardiovascular elements are within defined limits  Pulmonary  Respiratory (WDL): Within Defined Limits  Gastrointestinal  Gastrointestinal (WDL): Exceptions to WDL(past few days epigastric pain, shooting)  Anorexia: Loss of appetite without alteration in eating habits  Constipation: None  Diarrhea: None  Dysphagia: None  Esophagitis: None  Nausea: None  Pharyngitis: None  Vomiting: None  Dysgeusia: None  Dry Mouth: None  Genitourinary  Genitourinary (WDL): All genitourinary elements are within defined limits  Integumentary  Integumentary (WDL): All integumentary elements are within defined limits  Patient Coping  Patient Coping: Open/discussion  Distress Assessment     Accompanied by  Accompanied by: Family Member    Past History  Past Medical History:   Diagnosis Date     Endometriosis      Hemorrhoids      Pancreatic cancer (H)          Past Surgical History:   Procedure Laterality Date     IR PORT PLACEMENT >5 YEARS  9/3/2019    Right IJ port.     PERCUTANEOUS LIVER BIOPSY  08/15/2019     TOTAL ABDOMINAL HYSTERECTOMY W/ BILATERAL SALPINGOOPHORECTOMY  1980       Physical Exam    Recent Vitals 9/25/2019   Height -   Weight 144 lbs 11 oz   BSA (m2) 1.76 m2   /72   Pulse 102   Temp 98.5   Temp src 1   SpO2 96   Some recent data might be hidden       GENERAL: Alert and oriented to time place and person. Seated comfortably. In no distress.  She has lost some weight.  Otherwise she looks well.    HEAD: Atraumatic and normocephalic.    EYES: DAMON, EOMI.  No pallor.  No icterus.    Oral cavity: no mucosal lesion or tonsillar enlargement.    NECK: supple. JVP normal.  No thyroid enlargement.    LYMPH NODES: No palpable, cervical, axillary or inguinal  lymphadenopathy.    CHEST: clear to auscultation bilaterally.  Resonant to percussion throughout bilaterally.  Symmetrical breath movements bilaterally.    Port-A-Cath over the right upper chest looks unremarkable.    CVS: S1 and S2 are heard. Regular rate and rhythm.  No murmur or gallop or rub heard.  No peripheral edema.    ABDOMEN: Soft.  Slightly distended.  Slight epigastric tenderness.  No palpable hepatomegaly or splenomegaly.  No other mass palpable.  Bowel sounds heard.    EXTREMITIES: Warm.    SKIN: no rash, or bruising or purpura.  Has a full head of hair.        Lab Results    Recent Results (from the past 24 hour(s))   Magnesium    Collection Time: 09/25/19  9:37 AM   Result Value Ref Range    Magnesium 2.3 1.8 - 2.6 mg/dL   Comprehensive Metabolic Panel    Collection Time: 09/25/19  9:37 AM   Result Value Ref Range    Sodium 136 136 - 145 mmol/L    Potassium 4.2 3.5 - 5.0 mmol/L    Chloride 99 98 - 107 mmol/L    CO2 25 22 - 31 mmol/L    Anion Gap, Calculation 12 5 - 18 mmol/L    Glucose 140 (H) 70 - 125 mg/dL    BUN 10 8 - 22 mg/dL    Creatinine 0.70 0.60 - 1.10 mg/dL    GFR MDRD Af Amer >60 >60 mL/min/1.73m2    GFR MDRD Non Af Amer >60 >60 mL/min/1.73m2    Bilirubin, Total 0.5 0.0 - 1.0 mg/dL    Calcium 9.6 8.5 - 10.5 mg/dL    Protein, Total 7.7 6.0 - 8.0 g/dL    Albumin 3.0 (L) 3.5 - 5.0 g/dL    Alkaline Phosphatase 424 (H) 45 - 120 U/L    AST 43 (H) 0 - 40 U/L    ALT 32 0 - 45 U/L   HM1 (CBC with Diff)    Collection Time: 09/25/19  9:37 AM   Result Value Ref Range    WBC 9.6 4.0 - 11.0 thou/uL    RBC 3.94 3.80 - 5.40 mill/uL    Hemoglobin 11.8 (L) 12.0 - 16.0 g/dL    Hematocrit 36.3 35.0 - 47.0 %    MCV 92 80 - 100 fL    MCH 29.9 27.0 - 34.0 pg    MCHC 32.5 32.0 - 36.0 g/dL    RDW 14.0 11.0 - 14.5 %    Platelets 234 140 - 440 thou/uL    MPV 8.5 8.5 - 12.5 fL    Neutrophils % 78 (H) 50 - 70 %    Lymphocytes % 12 (L) 20 - 40 %    Monocytes % 6 2 - 10 %    Eosinophils % 2 0 - 6 %    Basophils % 1 0 -  2 %    Neutrophils Absolute 7.5 2.0 - 7.7 thou/uL    Lymphocytes Absolute 1.1 0.8 - 4.4 thou/uL    Monocytes Absolute 0.6 0.0 - 0.9 thou/uL    Eosinophils Absolute 0.2 0.0 - 0.4 thou/uL    Basophils Absolute 0.1 0.0 - 0.2 thou/uL             Imaging    Ir Port Placement 5+ Years    Result Date: 9/3/2019  LOCATION: [facility] DATE: 9/3/2019 PROCEDURE: IMPLANTABLE VENOUS PORT PLACEMENT 1.  Implantable venous access port placement. 2.  Ultrasound guidance for vascular access. A permanent image was stored. 3.  Fluoroscopic guidance for central venous access device placement. INTERVENTIONAL RADIOLOGIST: Jordyn Gonzalez MD INDICATION: Pancreatic cancer, plan for port placement. CONSENT: The risks, benefits and alternatives of the procedure were discussed with the patient or representative in detail. All questions were answered. Informed consent was given to proceed with the procedure. MODERATE SEDATION: Versed 2 mg IV; Fentanyl 100 mcg IV. During the time out, immediately prior to the administration of medications, the patient was reassessed for adequacy to receive conscious sedation.  Under physician supervision, Versed and fentanyl were administered for moderate sedation. Pulse oximetry, heart rate and blood pressure were continuously monitored by an independent trained observer. The physician spent 30 minutes of face-to-face sedation time with the patient. CONTRAST: None. ANTIBIOTICS: Clindamycin 900 mg ADDITIONAL MEDICATIONS: None. FLUOROSCOPIC TIME: 0.2 minutes. RADIATION DOSE: Air Kerma: 1 mGy. COMPLICATIONS: No immediate complications. UNIVERSAL PROTOCOL: The operative site was marked and any prior imaging was reviewed. Required items including blood products, implants, devices and special equipment was made available. Patient identity was confirmed either verbally, with demographic information, hospital assigned identification or other identification markers. A timeout was performed immediately prior to  the procedure. STERILE BARRIER TECHNIQUE: Maximum sterile barrier technique was used. Cutaneous antisepsis was performed at the operative site with application of 2% chlorhexidine and large sterile drape. Prior to the procedure, the  and assistant performed hand hygiene and wore hat, mask, sterile gown, and sterile gloves during the entire procedure. PROCEDURE:  Using real-time ultrasound guidance the right internal jugular vein was accessed. A subcutaneous pocket was created and irrigated with sterile normal saline. The catheter tubing was tunneled in an antegrade fashion from the port pocket to the dermatotomy  site. Over a guidewire, a peel-away sheath was advanced with fluoroscopic monitoring. Through the peel-away sheath, the catheter was advanced until the tip was at the cavoatrial junction. Positioning of the distal tip was confirmed with a radiograph from the in room fluoroscopic unit. The catheter was cut to length and attached firmly to the port. The port pocket incision was closed with layered absorbable suture and surgical glue. The dermatotomy site was closed with surgical glue. FINDINGS: Ultrasound shows an anechoic and compressible jugular vein. At the completion of the study, the port tip lies at the cavoatrial junction.     Successful power-injectable venous port placement. CPT codes for physician reference only: 27225 [id number] x 1     Nm Pet Ct Skull To Mid Thigh    Result Date: 8/30/2019  EXAM: NM PET CT SKULL TO MID THIGH LOCATION: [facility] DATE/TIME: 8/30/2019 9:13 AM INDICATION: Pancreatic adenocarcinoma, staging. Malignant neoplasm of body of pancreas. Liver metastases. Initial treatment strategy. COMPARISON: CT abdomen pelvis 08/01/2019 reviewed. TECHNIQUE: Serum glucose level 129 mg/dL. One hour post intravenous administration of 8.1 mCi F-18 FDG, PET imaging was performed from the skull base to the mid thighs utilizing attenuation correction with concurrent  axial CT and PET/CT image fusion. Dose reduction techniques were used. FINDINGS: Mass in the pancreatic head, as depicted on comparison CT estimated at 2.8 x 3.9 cm, demonstrates marked uptake (SUVmax 8.6), consistent with malignancy. Associated moderate upstream pancreatic parenchymal atrophy and duct dilatation. Numerous mildly enlarged FDG avid upper abdominal gastrohepatic, peripancreatic, sheldon hepatic and retroperitoneal lymph nodes. A representative sheldon hepatic node measures 1.4 x 2.4 cm associated with moderate uptake (SUVmax 4.0). Numerous FDG avid masses throughout the liver consistent with metastases, including a mass in the posterior right hepatic lobe measuring 3.1 cm associated with moderate uptake (SUVmax 5.8). Numerous scattered small bilateral pulmonary nodules suspicious for pulmonary metastases,  some of the larger of which demonstrate mild FDG activity such as a 0.8 x 1.3 cm nodule in the left lower lobe with SUVmax 2.5. FDG avid subpleural nodule posteromedial right lower chest consistent with a metastasis. No suspicious focal skeletal uptake. Diffuse opacification hypoplastic right maxillary sinus with surrounding periosteal thickening and low-level uptake suggesting chronic sinusitis. FDG avid nodule in the proximal ascending colon measuring 1.2 cm associated with marked uptake (SUVmax 18.1). A hypodense circumscribed nodule in the left adrenal demonstrates no FDG activity, likely benign adenoma. Moderate atherosclerotic calcifications including the coronary arteries. Tiny gallstones and/or sludge. Tiny nonobstructing stone left kidney. Few small left renal cysts. Tiny fat-containing umbilical hernia. Mild colonic diverticulosis. Hysterectomy. Mild scattered degenerative changes in the spine. Benign vertebral body hemangiomas T9 and T12.     CONCLUSION: 1. Metastatic pancreatic cancer with primary mass in the pancreatic head and metastases involving the liver, lungs and abdominal lymph  nodes. 2. Left adrenal adenoma. 3. Chronic right maxillary sinusitis. 4. FDG avid nodule proximal ascending colon, likely an incidental colonic adenomatous neoplasm, although likely of secondary concern in this patient with metastatic pancreatic cancer.        Signed by: Ashia Kahn MD

## 2021-06-01 NOTE — PROGRESS NOTES
Patient is here for two week  follow-up of pancreatic cancer.   Due D1C2 FOLFIRINOX pending labs/OV with Dr. Kahn.  Accompanied by daughter, Mona. Yesenia Thornton RN

## 2021-06-01 NOTE — PROGRESS NOTES
[facility] Hematology and Oncology Progress Note    Patient: Glenis Pop  MRN: [id number]  Date of Service: 09/11/2019        Reason for Visit:    D1C1 palliative FOLFIRINOX chemotherapy + Neulasta support.    Assessment and Plan:    Cancer Staging    1. 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     Presented with mild abdominal discomfort and backache for ~2 months.      08/01/19 CT CAP - numerous liver mets, a 35 mm mass in the body of the pancreas, upper abdominal lymph nodes that were enlarged and a left renal lesion that appears to be metastasis.      08/15/19 ultrasound-guided core needle biopsy from 1 of the liver mets - poorly differentiated adenocarcinoma consistent with a pancreatic primary.     08/27/19 CEA level was slightly elevated at 4.4.      08/27/19 CA-19-9 was grossly elevated at more than 22,000.      08/30/19 NM PET - mass in the pancreas is PET positive.  Numerous metastasis are seen in the liver, lungs and abdominal lymph nodes.  The left abdominal lesion is not lighting up so it appears to be an adenoma.  The unexpected finding is a PET positive nodule in the proximal ascending colon.  This would be an unusual site of metastasis for the pancreas cancer.  It could be an advanced colon polyp or even a separate colon cancer.    Opted to monitor PET positive nodule in the colon since she already has widely metastatic pancreas cancer.  Most of the chemotherapy is for pancreas cancer should be effective for colon cancer also in case this is a form of cancer.      Palliative FOLFIRINOX chemotherapy was recommended starting at 80% dosing.      09/03/19 IR port-a-cath placed.    09/11/19:    CBC - HgB 11.6.  WBC, ANC and Plts WNL.    CMP - mildly elevated AST and Alk Phos.  Mild hypoalbuminemia.    Magnesium - WNL.    Glenis presents feeling quite good and in good spirits.  Mild constipation and gas.  Appetite OK - weight quite stable.  Mild occasional  abdominal and back pain - managed with 2-ES Tylenol once to twice daily.  She lives alone but has family who will be staying with her as necessary.    She has reviewed the printed information about FOLFIRINOX chemotherapy to read she was previously given.    She understands very clearly that treatment will be only palliative, not curative.      This morning she attended the chemotherapy education class with our nurses.      Any residual questions were addressed during my appointment.      She has signed a consent form to treat with chemotherapy.     Proceed D1C1 palliative FOLFIRINOX chemotherapy at 80% dosing + Neulasta.     If she tolerates 80% dosing well, will consider 90% dosing for C2 and 100% dosing for C3.      She has picked up her prescriptions for antinausea medications and EMLA cream that were sent to her pharmacy.  I reviewed their use.  I encouraged her to be proactive in treating any nausea.    She was instructed that if she would note a fever > 100.4F the Cancer clinic needs to be called immediately day or night as inpatient admission and antibiotics may be needed.    I reviewed options for treating mild constipation including stool softeners, increased fiber, activity and increased fluids.    She has not made the appointment with the genetic counselor, wanting to wait until she feels comfortable with chemotherapy.  She was reminded that the purpose is twofold; if she has a BRCA mutation we can consider treatment with a pop inhibitor if she has a good response to the initial chemotherapy and if she is found to have a germline mutation that is pathogenic it would be helpful for her family since they can take measures to detect the cancer earlier or prevent it.      09/25/19 - follow up D1C1 palliative FOLFIRINOX chemotherapy at 80% dosing + Neulasta.     CA-19-9 level with each cycle.    After 4 cycles (approximately 2 months of treatment) we will repeat a NM PET scan or CT scan to see whether she is  "responding to chemotherapy.      ECOG Performance:     ECOG Performance Status: 0    Distress Assessment:    Distress Assessment Score: Extreme distress(first day of chemo)    Pain:    Pain Score (Initial OR Reassessment): No/Denies Pain        TT > 25 minutes face to face with > 50 % in counseling and coordination of care.    Leonidas Adams, CNP      CC: Austin Mendoza MD  __________________________________    Interim History:    Ms. Glenis Pop is here for follow-up with her daughter.  She looks and feels quite good, a bit anxious with \"fear of the unknown\" regarding starting chemotherapy.  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  No concerning discomfort from Port-A-Cath placement.  She continues with a fair appetite, eating small meals as she feels full very easily.  Her abdominal and back pain is very stable managed with 2-4 ES Tylenol/day.  She denies cough, fever, chills, unusual headaches, visual or mentation disturbance, bladder issues, rash.  She does note some mild constipation, not taking anything for it.    Pain Status:    Currently in Pain: No/denies    Review of Systems    Constitutional  Constitutional (WDL): All constitutional elements are within defined limits  Neurosensory  Neurosensory (WDL): All neurosensory elements are within defined limits  Cardiovascular  Cardiovascular (WDL): Exceptions to WDL  Edema: No  Pulmonary  Respiratory (WDL): Within Defined Limits  Gastrointestinal  Gastrointestinal (WDL): Exceptions to WDL  Constipation: Occasional or intermittent symptoms, occasional use of stool softeners, laxatives, dietary modification, or enema(pellets since sunday)  Genitourinary  Genitourinary (WDL): All genitourinary elements are within defined limits  Integumentary  Integumentary (WDL): All integumentary elements are within defined limits(rash on face is chronic)  Patient Coping  Patient Coping: " "Accepting;Open/discussion  Distress Assessment  Distress Assessment Score: Extreme distress(first day of chemo)  Accompanied by  Accompanied by: Family Member    Past History:    Past Medical History:   Diagnosis Date     Endometriosis      Hemorrhoids      Pancreatic cancer (H)          Past Surgical History:   Procedure Laterality Date     IR PORT PLACEMENT >5 YEARS  9/3/2019    Right IJ port.     PERCUTANEOUS LIVER BIOPSY  08/15/2019     TOTAL ABDOMINAL HYSTERECTOMY W/ BILATERAL SALPINGOOPHORECTOMY  1980       Physical Exam:    Recent Vitals 9/6/2019   Height 5' 7\"   Weight 153 lbs 5 oz   BSA (m2) 1.81 m2   /82   Pulse 97   Temp -   Temp src -   SpO2 96   Some recent data might be hidden       GENERAL:   Alert and oriented.  Comfortable.  In no acute distress.  Slightly anxious.      HEENT:   Atraumatic and normocephalic. GAYATHRI.  EOMI.  No pallor.  No icterus.  No mucosal lesions.    LYMPH NODES:  No palpable cervical, axillary or inguinal lymphadenopathy.    CHEST:   Lungs clear to auscultation bilaterally.    Port-A-Cath over the right upper chest looks unremarkable.    CVS:    S1 and S2 heard.  Regular rate and rhythm.  No murmur, gallop or rub heard.  No peripheral edema.    ABDOMEN:   Soft.  Not tender.  Mildly distended - palpable hepatomegaly in the epigastrium.  No other mass palpable.  Bowel sounds heard.    EXTREMITIES:  Warm.  No SAM.    SKIN:    No rash, bruising or purpura noted.  Full head of hair.      Lab Results:    Reviewed with patient.    Recent Results (from the past 24 hour(s))   Magnesium   Result Value Ref Range    Magnesium 2.0 1.8 - 2.6 mg/dL   Comprehensive Metabolic Panel   Result Value Ref Range    Sodium 134 (L) 136 - 145 mmol/L    Potassium 4.2 3.5 - 5.0 mmol/L    Chloride 98 98 - 107 mmol/L    CO2 27 22 - 31 mmol/L    Anion Gap, Calculation 9 5 - 18 mmol/L    Glucose 123 70 - 125 mg/dL    BUN 6 (L) 8 - 22 mg/dL    Creatinine 0.66 0.60 - 1.10 mg/dL    GFR MDRD Af Amer >60 >60 " mL/min/1.73m2    GFR MDRD Non Af Amer >60 >60 mL/min/1.73m2    Bilirubin, Total 0.8 0.0 - 1.0 mg/dL    Calcium 9.4 8.5 - 10.5 mg/dL    Protein, Total 8.2 (H) 6.0 - 8.0 g/dL    Albumin 3.2 (L) 3.5 - 5.0 g/dL    Alkaline Phosphatase 271 (H) 45 - 120 U/L    AST 42 (H) 0 - 40 U/L    ALT 19 0 - 45 U/L   HM1 (CBC with Diff)   Result Value Ref Range    WBC 9.2 4.0 - 11.0 thou/uL    RBC 3.88 3.80 - 5.40 mill/uL    Hemoglobin 11.6 (L) 12.0 - 16.0 g/dL    Hematocrit 35.9 35.0 - 47.0 %    MCV 93 80 - 100 fL    MCH 29.9 27.0 - 34.0 pg    MCHC 32.3 32.0 - 36.0 g/dL    RDW 13.2 11.0 - 14.5 %    Platelets 244 140 - 440 thou/uL    MPV 8.5 8.5 - 12.5 fL    Neutrophils % 79 (H) 50 - 70 %    Lymphocytes % 8 (L) 20 - 40 %    Monocytes % 7 2 - 10 %    Eosinophils % 5 0 - 6 %    Basophils % 1 0 - 2 %    Neutrophils Absolute 7.2 2.0 - 7.7 thou/uL    Lymphocytes Absolute 0.8 0.8 - 4.4 thou/uL    Monocytes Absolute 0.7 0.0 - 0.9 thou/uL    Eosinophils Absolute 0.4 0.0 - 0.4 thou/uL    Basophils Absolute 0.1 0.0 - 0.2 thou/uL       Imaging:    Ir Port Placement 5+ Years    Result Date: 9/3/2019  LOCATION: [facility] DATE: 9/3/2019 PROCEDURE: IMPLANTABLE VENOUS PORT PLACEMENT 1.  Implantable venous access port placement. 2.  Ultrasound guidance for vascular access. A permanent image was stored. 3.  Fluoroscopic guidance for central venous access device placement. INTERVENTIONAL RADIOLOGIST: Jordyn Gonzalez MD INDICATION: Pancreatic cancer, plan for port placement. CONSENT: The risks, benefits and alternatives of the procedure were discussed with the patient or representative in detail. All questions were answered. Informed consent was given to proceed with the procedure. MODERATE SEDATION: Versed 2 mg IV; Fentanyl 100 mcg IV. During the time out, immediately prior to the administration of medications, the patient was reassessed for adequacy to receive conscious sedation.  Under physician supervision, Versed and fentanyl were  administered for moderate sedation. Pulse oximetry, heart rate and blood pressure were continuously monitored by an independent trained observer. The physician spent 30 minutes of face-to-face sedation time with the patient. CONTRAST: None. ANTIBIOTICS: Clindamycin 900 mg ADDITIONAL MEDICATIONS: None. FLUOROSCOPIC TIME: 0.2 minutes. RADIATION DOSE: Air Kerma: 1 mGy. COMPLICATIONS: No immediate complications. UNIVERSAL PROTOCOL: The operative site was marked and any prior imaging was reviewed. Required items including blood products, implants, devices and special equipment was made available. Patient identity was confirmed either verbally, with demographic information, hospital assigned identification or other identification markers. A timeout was performed immediately prior to the procedure. STERILE BARRIER TECHNIQUE: Maximum sterile barrier technique was used. Cutaneous antisepsis was performed at the operative site with application of 2% chlorhexidine and large sterile drape. Prior to the procedure, the  and assistant performed hand hygiene and wore hat, mask, sterile gown, and sterile gloves during the entire procedure. PROCEDURE:  Using real-time ultrasound guidance the right internal jugular vein was accessed. A subcutaneous pocket was created and irrigated with sterile normal saline. The catheter tubing was tunneled in an antegrade fashion from the port pocket to the dermatotomy  site. Over a guidewire, a peel-away sheath was advanced with fluoroscopic monitoring. Through the peel-away sheath, the catheter was advanced until the tip was at the cavoatrial junction. Positioning of the distal tip was confirmed with a radiograph from the in room fluoroscopic unit. The catheter was cut to length and attached firmly to the port. The port pocket incision was closed with layered absorbable suture and surgical glue. The dermatotomy site was closed with surgical glue. FINDINGS: Ultrasound shows an anechoic and  compressible jugular vein. At the completion of the study, the port tip lies at the cavoatrial junction.     Successful power-injectable venous port placement. CPT codes for physician reference only: 84996 [id number] x 1     Nm Pet Ct Skull To Mid Thigh    Result Date: 8/30/2019  EXAM: NM PET CT SKULL TO MID THIGH LOCATION: [facility] DATE/TIME: 8/30/2019 9:13 AM INDICATION: Pancreatic adenocarcinoma, staging. Malignant neoplasm of body of pancreas. Liver metastases. Initial treatment strategy. COMPARISON: CT abdomen pelvis 08/01/2019 reviewed. TECHNIQUE: Serum glucose level 129 mg/dL. One hour post intravenous administration of 8.1 mCi F-18 FDG, PET imaging was performed from the skull base to the mid thighs utilizing attenuation correction with concurrent axial CT and PET/CT image fusion. Dose reduction techniques were used. FINDINGS: Mass in the pancreatic head, as depicted on comparison CT estimated at 2.8 x 3.9 cm, demonstrates marked uptake (SUVmax 8.6), consistent with malignancy. Associated moderate upstream pancreatic parenchymal atrophy and duct dilatation. Numerous mildly enlarged FDG avid upper abdominal gastrohepatic, peripancreatic, sheldon hepatic and retroperitoneal lymph nodes. A representative sheldon hepatic node measures 1.4 x 2.4 cm associated with moderate uptake (SUVmax 4.0). Numerous FDG avid masses throughout the liver consistent with metastases, including a mass in the posterior right hepatic lobe measuring 3.1 cm associated with moderate uptake (SUVmax 5.8). Numerous scattered small bilateral pulmonary nodules suspicious for pulmonary metastases,  some of the larger of which demonstrate mild FDG activity such as a 0.8 x 1.3 cm nodule in the left lower lobe with SUVmax 2.5. FDG avid subpleural nodule posteromedial right lower chest consistent with a metastasis. No suspicious focal skeletal uptake. Diffuse opacification hypoplastic right maxillary sinus with surrounding  periosteal thickening and low-level uptake suggesting chronic sinusitis. FDG avid nodule in the proximal ascending colon measuring 1.2 cm associated with marked uptake (SUVmax 18.1). A hypodense circumscribed nodule in the left adrenal demonstrates no FDG activity, likely benign adenoma. Moderate atherosclerotic calcifications including the coronary arteries. Tiny gallstones and/or sludge. Tiny nonobstructing stone left kidney. Few small left renal cysts. Tiny fat-containing umbilical hernia. Mild colonic diverticulosis. Hysterectomy. Mild scattered degenerative changes in the spine. Benign vertebral body hemangiomas T9 and T12.     CONCLUSION: 1. Metastatic pancreatic cancer with primary mass in the pancreatic head and metastases involving the liver, lungs and abdominal lymph nodes. 2. Left adrenal adenoma. 3. Chronic right maxillary sinusitis. 4. FDG avid nodule proximal ascending colon, likely an incidental colonic adenomatous neoplasm, although likely of secondary concern in this patient with metastatic pancreatic cancer.

## 2021-06-01 NOTE — TELEPHONE ENCOUNTER
Call placed to Glenis after consultation with Dr Kahn at [facility]. Introduced self and let her know that I was her nurse navigator. Let Glenis know what types of items I am able to assist with.   I generally provide assistance with psycho social needs including but not limited to,  financial assistance,   obtaining education materials,   transportation assistance,  help with meals,   community programs,   help finding support or support groups,   and getting connected with our psychotherapist when needed.    I am also here to help guide you through our system.    Let Glenis know that I am the person to call if she does not know who else to call.  Please do not hesitate to contact me if you have any questions or concerns. Card with contact information mailed with original packet. At present Glenis denies any needs. States she will be starting chemotherapy tomorrow and is eager to move forward.

## 2021-06-01 NOTE — PROGRESS NOTES
Pt here today for pump dc. She tolerated infusion well, some fatigue but pain has improved. Neupogen injection given, education provided. She left clinic ambulatory with daughter.

## 2021-06-01 NOTE — PATIENT INSTRUCTIONS - HE
Serving Up Weight Loss Prevention Strategies     Keep a list of what foods have an altered taste, and avoid those foods while on treatment    Try foods that you never cared for in the past; these could become your new favorites    Avoid warm meats, like steak; instead try cold meats, like roast beef.    Avoid the smells of cooking, if possible    Try new or different foods, like smoked meat or fish, pickled eggs, or different ethnic cuisines.    If food tastes overly sweet, try adding acidic ingredients such as ketchup, hot sauce, relish, or a squeeze of lemon juice    If food tastes metallic, try eating with a plastic fork or spoon.    If food has no taste, try adding sauces, condiments, or spices.    Eat small portions, more frequently. Graze throughout the day instead of three large meals.     Stock up on  lunchbox  foods, such as fruit cups, yogurt or pudding cups, peanut butter crackers, dried fruits, and chips. These small self-contained portions are a convenient way to add in needed calories.    Keep small servings of ice cream, frozen yogurt, and bonbons in the freezer.    Serve a small portion on a large plate; it looks more appetizing than a large portion and may seem less challenging to finish.    Avocados, potatoes, pasta, and breads provide much-needed fat and calories.    Comfort foods are the best. The foods you eat when you are sick are often the best tolerated. For example, soups, toast and sandwiches.

## 2021-06-01 NOTE — PATIENT INSTRUCTIONS - HE
Recent Results (from the past 24 hour(s))   Magnesium   Result Value Ref Range    Magnesium 2.0 1.8 - 2.6 mg/dL   Comprehensive Metabolic Panel   Result Value Ref Range    Sodium 134 (L) 136 - 145 mmol/L    Potassium 4.2 3.5 - 5.0 mmol/L    Chloride 98 98 - 107 mmol/L    CO2 27 22 - 31 mmol/L    Anion Gap, Calculation 9 5 - 18 mmol/L    Glucose 123 70 - 125 mg/dL    BUN 6 (L) 8 - 22 mg/dL    Creatinine 0.66 0.60 - 1.10 mg/dL    GFR MDRD Af Amer >60 >60 mL/min/1.73m2    GFR MDRD Non Af Amer >60 >60 mL/min/1.73m2    Bilirubin, Total 0.8 0.0 - 1.0 mg/dL    Calcium 9.4 8.5 - 10.5 mg/dL    Protein, Total 8.2 (H) 6.0 - 8.0 g/dL    Albumin 3.2 (L) 3.5 - 5.0 g/dL    Alkaline Phosphatase 271 (H) 45 - 120 U/L    AST 42 (H) 0 - 40 U/L    ALT 19 0 - 45 U/L   HM1 (CBC with Diff)   Result Value Ref Range    WBC 9.2 4.0 - 11.0 thou/uL    RBC 3.88 3.80 - 5.40 mill/uL    Hemoglobin 11.6 (L) 12.0 - 16.0 g/dL    Hematocrit 35.9 35.0 - 47.0 %    MCV 93 80 - 100 fL    MCH 29.9 27.0 - 34.0 pg    MCHC 32.3 32.0 - 36.0 g/dL    RDW 13.2 11.0 - 14.5 %    Platelets 244 140 - 440 thou/uL    MPV 8.5 8.5 - 12.5 fL    Neutrophils % 79 (H) 50 - 70 %    Lymphocytes % 8 (L) 20 - 40 %    Monocytes % 7 2 - 10 %    Eosinophils % 5 0 - 6 %    Basophils % 1 0 - 2 %    Neutrophils Absolute 7.2 2.0 - 7.7 thou/uL    Lymphocytes Absolute 0.8 0.8 - 4.4 thou/uL    Monocytes Absolute 0.7 0.0 - 0.9 thou/uL    Eosinophils Absolute 0.4 0.0 - 0.4 thou/uL    Basophils Absolute 0.1 0.0 - 0.2 thou/uL     Claritin daily for neulasta

## 2021-06-01 NOTE — PROGRESS NOTES
Patient is here for month follow-up of pancreatic cancer.  Accompanied by daughter Doris. Yesenia Thornton RN

## 2021-06-01 NOTE — PROGRESS NOTES
Patient ambulated into [facility]fusion [facility] accompanied by her daughter, Mona. PPE donned and CADD pump removed. PORT flushed with Normal Saline and 6 cc Heparin and deaccessed. Patient received Neulasta sub q injection in right upper outer arm. All questions answered and patient was discharged home.

## 2021-06-02 NOTE — PROGRESS NOTES
Patient is here for two week follow-up of pancreatic cancer. Due D1C4 FOLFIRINOX pending labs/OV with Dr. Kahn.  Yesenia Thornton RN

## 2021-06-02 NOTE — PATIENT INSTRUCTIONS - HE
Recent Results (from the past 24 hour(s))   Magnesium   Result Value Ref Range    Magnesium 2.0 1.8 - 2.6 mg/dL   Comprehensive Metabolic Panel   Result Value Ref Range    Sodium 136 136 - 145 mmol/L    Potassium 4.1 3.5 - 5.0 mmol/L    Chloride 99 98 - 107 mmol/L    CO2 24 22 - 31 mmol/L    Anion Gap, Calculation 13 5 - 18 mmol/L    Glucose 155 (H) 70 - 125 mg/dL    BUN 13 8 - 22 mg/dL    Creatinine 0.68 0.60 - 1.10 mg/dL    GFR MDRD Af Amer >60 >60 mL/min/1.73m2    GFR MDRD Non Af Amer >60 >60 mL/min/1.73m2    Bilirubin, Total 0.5 0.0 - 1.0 mg/dL    Calcium 9.3 8.5 - 10.5 mg/dL    Protein, Total 7.2 6.0 - 8.0 g/dL    Albumin 2.9 (L) 3.5 - 5.0 g/dL    Alkaline Phosphatase 413 (H) 45 - 120 U/L    AST 40 0 - 40 U/L    ALT 29 0 - 45 U/L   HM1 (CBC with Diff)   Result Value Ref Range    WBC 12.3 (H) 4.0 - 11.0 thou/uL    RBC 3.78 (L) 3.80 - 5.40 mill/uL    Hemoglobin 11.2 (L) 12.0 - 16.0 g/dL    Hematocrit 35.1 35.0 - 47.0 %    MCV 93 80 - 100 fL    MCH 29.6 27.0 - 34.0 pg    MCHC 31.9 (L) 32.0 - 36.0 g/dL    RDW 15.6 (H) 11.0 - 14.5 %    Platelets 237 140 - 440 thou/uL    MPV 8.5 8.5 - 12.5 fL    Neutrophils % 79 (H) 50 - 70 %    Lymphocytes % 10 (L) 20 - 40 %    Monocytes % 7 2 - 10 %    Eosinophils % 1 0 - 6 %    Basophils % 1 0 - 2 %    Neutrophils Absolute 9.7 (H) 2.0 - 7.7 thou/uL    Lymphocytes Absolute 1.3 0.8 - 4.4 thou/uL    Monocytes Absolute 0.8 0.0 - 0.9 thou/uL    Eosinophils Absolute 0.2 0.0 - 0.4 thou/uL    Basophils Absolute 0.1 0.0 - 0.2 thou/uL

## 2021-06-02 NOTE — PROGRESS NOTES
Patient ambulated into Infusion [facility] accompanied by her daughter,Mona. Patient is alert and oriented and VSS.Patient stated she has been feeling good and has been trying to eat more protein in her diet. PPE donned and CADD pump disconnected. PORT flushed with Normal Saline and 6 cc Heparin and deaccessed. Dry 2 x 2 gauze taped over PORT site. Patient received Neulasta sub q 6 mg injection in right upper outer arm. All questions answered and patient was discharged home.

## 2021-06-02 NOTE — PROGRESS NOTES
[facility] Hematology and Oncology Progress Note    Patient: Glenis Pop  MRN: [id number]  Date of Service: 10/23/2019        Reason for Visit    Follow-up regarding metastatic pancreas cancer.    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    Pain   : Very mild pain in the epigastrium.  She feels that the Tylenol is enough for pain control.    Ms. Glenis Pop is a 69 y.o. woman who presents with some complaints of mild abdominal discomfort and backache over 2 months.  CT scan of the abdomen and pelvis that was done on 8/1/2019 showed numerous liver mets, a 35 mm mass in the body of the pancreas, upper abdominal lymph nodes that were enlarged and a left renal lesion that appears to be metastasis.  She had an ultrasound-guided core needle biopsy from 1 of the liver mets on 8/15/2019 which showed a poorly differentiated adenocarcinoma consistent with a pancreatic primary.   The CEA level was slightly elevated at 4.4.  The CA-19-9 was grossly elevated at more than 22,000.  I reviewed the results with her.  The PET CT scan done on 8/30/2019showed the mass in the pancreas that is PET positive.  Numerous metastases are seen in the liver, lungs and abdominal lymph nodes.  The left abdominal lesion is not lighting up so it appears to be an adenoma.  The unexpected finding is a PET positive nodule in the proximal ascending colon.This could be an unusual site of metastasis for the pancreas cancer.  This could also turn out to be an advanced colon polyp or even a separate colon cancer.  We decided to watch it in the subsequent imaging studies.    She started chemotherapy with FOLFIRINOX on  9/11/2019.  The first cycle was given at 80% dose and with Neulasta support.  The second cycle was given at the 90% dose and we could go 200% dose with cycle #3.    1.  She is here for cycle #4 of FOLFIRINOX chemotherapy.  She appears to have tolerated cycle #3 of chemotherapy which was given 100% dose quite well.  She really has no complaints.  The only problem is that she continues to lose weight.  I also noticed that the CA-19-9 level has not yet started coming down.  It is slowly going up.  This is also concerning.  I discussed with her that I think we should proceed with cycle #4 of FOLFIRINOX chemotherapy now.  We will obtain a PET CT scan at the end of the cycle and see what it shows.  If that shows that she is responding to treatment, then we will continue with the same chemotherapy.  On the other hand if that shows that she is not responding to chemotherapy, then we will have to change our strategy.  I think in that situation we will have to change to gemcitabine and Abraxane chemotherapy.  She voiced understanding of the overall plan.    2.  Meanwhile the only problem that she is facing is progressive loss of weight.  I wonder whether this is more due to some shrinkage of disease and reduction of some fluid in the abdomen rather than a metabolic problem.  She appears to be eating very well.  Encouraged her to continue to eat well.  Encouraged her not to avoid any foods.  She can go for a high calorie diet.  She voiced understanding.    3.  Labs from today were reviewed.  Blood counts are overall stable.  Metabolic panel is also stable.  Abdomen is slightly better than last week.  We can proceed with cycle #4 of FOLFIRINOX chemotherapy today at 100% dose.  She will receive Neulasta after the pump disconnect.    4.  Return to clinic with MD or NP in 2 weeks.  Labs according to treatment plan and appointment for cycle #5 of FOLFIRINOX chemotherapy.  I have ordered a PET CT scan which is to be scheduled a  couple of days before that appointment.    Time spend >25 minutes face to face with the patient. More than 50 % in counseling and coordination of care.    Problem List    1. Malignant neoplasm of body of pancreas (H)  CC OFFICE VISIT LONG    NM PET CT Skull to Mid Thigh    NURSE VISIT    CC OFFICE VISIT LONG    Infusion Appointment    DISCONTINUED: sodium chloride 0.9% 250 mL infusion    DISCONTINUED: palonosetron injection 0.25 mg (ALOXI)    DISCONTINUED: fosaprepitant 150 mg, dexamethasone 12 mg in sodium chloride 0.9% 150 mL    DISCONTINUED: atropine injection 0.5 mg    DISCONTINUED: oxaliplatin 150 mg in dextrose 5% 250 mL (ELOXATIN)    DISCONTINUED: dextrose 5% 500 mL infusion    DISCONTINUED: irinotecan 300 mg in dextrose 5% 500 mL (CAMPTOSAR)    DISCONTINUED: leucovorin 700 mg in dextrose 5% 250 mL (WELLCOVORIN)    DISCONTINUED: fluorouracil injection 700 mg (ADRUCIL)    DISCONTINUED: fluorouracil 2,400 mg/m2 = 4,200 mg in sodium chloride 0.9% 242 mL (ADRUCIL)   2. Encounter for antineoplastic chemotherapy  CC OFFICE VISIT [facility]    NURSE VISIT    CC OFFICE VISIT LONG    Infusion Appointment    DISCONTINUED: sodium chloride 0.9% 250 mL infusion    DISCONTINUED: palonosetron injection 0.25 mg (ALOXI)    DISCONTINUED: fosaprepitant 150 mg, dexamethasone 12 mg in sodium chloride 0.9% 150 mL    DISCONTINUED: atropine injection 0.5 mg    DISCONTINUED: oxaliplatin 150 mg in dextrose 5% 250 mL (ELOXATIN)    DISCONTINUED: dextrose 5% 500 mL infusion    DISCONTINUED: irinotecan 300 mg in dextrose 5% 500 mL (CAMPTOSAR)    DISCONTINUED: leucovorin 700 mg in dextrose 5% 250 mL (WELLCOVORIN)    DISCONTINUED: fluorouracil injection 700 mg (ADRUCIL)    DISCONTINUED: fluorouracil 2,400 mg/m2 = 4,200 mg in sodium chloride 0.9% 242 mL (ADRUCIL)   3. Liver metastases (H)  NM PET CT Skull to Mid Thigh        CC: Austin Mendoza,  MD    ______________________________________________________________________________    History of Present Illness    Ms. Glenis Pop is here for follow-up with her daughter.    She says that overall she feels fine.  She does not have any pain.  The only concern is that she has lost another 5 pounds of weight.  She says that this is in spite of trying to eat very well.  She says that she wakes up in the morning at 5 AM and then grazes through the day.  There is no food that she is trying to avoid.  In spite of that she appears to be losing some weight.    She has had no problems with nausea or vomiting.  No abdominal pain.  No constipation and no diarrhea.  She does not have any mouth sores.  No difficulty swallowing.  She says that there is no particular food that she avoids.    No fevers.  No night sweats.  No lumps or bumps anywhere.  No unusual headaches.  No eyesight problems.  No shortness of breath.  No chest pain or cough.  No skin rashes.  No numbness or tingling.  No difficulty with urination.    Please see below.  A 14 point review of system is otherwise completely negative.      Pain Status  Currently in Pain: No/denies    Review of Systems    Constitutional  Constitutional (WDL): Exceptions to WDL  Fatigue: Fatigue relieved by rest(better past two txs)  Fever: None  Chills: None  Weight Gain: None  Weight Loss: to <10% from baseline, intervention not indicated(5#)  Neurosensory  Neurosensory (WDL): Exceptions to WDL  Peripheral Motor Neuropathy: Asymptomatic, clinical or diagnostic observations only, intervention not indicated  Ataxia: None  Peripheral Sensory Neuropathy: None(cold sensitivity)  Confusion: None  Syncope: None  Cardiovascular  Cardiovascular (WDL): All cardiovascular elements are within defined limits  Pulmonary  Respiratory (WDL): Within Defined Limits  Gastrointestinal  Gastrointestinal (WDL): Exceptions to WDL  Anorexia: None  Constipation: None  Diarrhea: None  Dysphagia:  None  Esophagitis: Asymptomatic, clinical or diagnostic observations only, intervention not indicated(first days after tx)  Nausea: None  Pharyngitis: None  Vomiting: None  Dysgeusia: None  Dry Mouth: None  Genitourinary  Genitourinary (WDL): All genitourinary elements are within defined limits  Integumentary  Integumentary (WDL): All integumentary elements are within defined limits  Patient Coping  Patient Coping: Accepting  Distress Assessment  Distress Assessment Score: 1  Accompanied by  Accompanied by: Family Member    Past History  Past Medical History:   Diagnosis Date     Endometriosis      Hemorrhoids      Pancreatic cancer (H)          Past Surgical History:   Procedure Laterality Date     IR PORT PLACEMENT >5 YEARS  9/3/2019    Right IJ port.     PERCUTANEOUS LIVER BIOPSY  08/15/2019     TOTAL ABDOMINAL HYSTERECTOMY W/ BILATERAL SALPINGOOPHORECTOMY  1980       Physical Exam    Recent Vitals 10/23/2019   Weight 138 lbs 2 oz   BSA (m2) 1.72 m2   /79   Pulse 111   Temp 98   Temp src 1   SpO2 97   Some recent data might be hidden       GENERAL: Alert and oriented to time place and person. Seated comfortably. In no distress.  She looks well overall but appears to have lost little bit more weight.    HEAD: Atraumatic and normocephalic.    EYES: DAMON, EOMI.  No pallor.  No icterus.    Oral cavity: no mucosal lesion or tonsillar enlargement.    NECK: supple. JVP normal.  No thyroid enlargement.    LYMPH NODES: No palpable, cervical, axillary or inguinal lymphadenopathy.    CHEST: clear to auscultation bilaterally.  Resonant to percussion throughout bilaterally.  Symmetrical breath movements bilaterally.  The Port-A-Cath over the right upper chest looks unremarkable.    CVS: S1 and S2 are heard. Regular rate and rhythm.  No murmur or gallop or rub heard.  No peripheral edema.    ABDOMEN: Soft. Not tender.  I think that the abdominal distention has come down slightly at the abdomen is a bit more soft.  No  palpable hepatomegaly or splenomegaly.  No other mass palpable.  Bowel sounds heard.    EXTREMITIES: Warm.    SKIN: no rash, or bruising or purpura.  Has a full head of hair.          Lab Results    Recent Results (from the past 24 hour(s))   Magnesium    Collection Time: 10/23/19  8:15 AM   Result Value Ref Range    Magnesium 1.9 1.8 - 2.6 mg/dL   Comprehensive Metabolic Panel    Collection Time: 10/23/19  8:15 AM   Result Value Ref Range    Sodium 137 136 - 145 mmol/L    Potassium 3.5 3.5 - 5.0 mmol/L    Chloride 102 98 - 107 mmol/L    CO2 25 22 - 31 mmol/L    Anion Gap, Calculation 10 5 - 18 mmol/L    Glucose 163 (H) 70 - 125 mg/dL    BUN 16 8 - 22 mg/dL    Creatinine 0.67 0.60 - 1.10 mg/dL    GFR MDRD Af Amer >60 >60 mL/min/1.73m2    GFR MDRD Non Af Amer >60 >60 mL/min/1.73m2    Bilirubin, Total 0.6 0.0 - 1.0 mg/dL    Calcium 9.6 8.5 - 10.5 mg/dL    Protein, Total 7.1 6.0 - 8.0 g/dL    Albumin 3.0 (L) 3.5 - 5.0 g/dL    Alkaline Phosphatase 361 (H) 45 - 120 U/L    AST 38 0 - 40 U/L    ALT 26 0 - 45 U/L   HM1 (CBC with Diff)    Collection Time: 10/23/19  8:15 AM   Result Value Ref Range    WBC 10.7 4.0 - 11.0 thou/uL    RBC 3.78 (L) 3.80 - 5.40 mill/uL    Hemoglobin 11.4 (L) 12.0 - 16.0 g/dL    Hematocrit 35.6 35.0 - 47.0 %    MCV 94 80 - 100 fL    MCH 30.2 27.0 - 34.0 pg    MCHC 32.0 32.0 - 36.0 g/dL    RDW 17.9 (H) 11.0 - 14.5 %    Platelets 201 140 - 440 thou/uL    MPV 8.9 8.5 - 12.5 fL    Neutrophils % 76 (H) 50 - 70 %    Lymphocytes % 13 (L) 20 - 40 %    Monocytes % 7 2 - 10 %    Eosinophils % 1 0 - 6 %    Basophils % 1 0 - 2 %    Neutrophils Absolute 8.1 (H) 2.0 - 7.7 thou/uL    Lymphocytes Absolute 1.4 0.8 - 4.4 thou/uL    Monocytes Absolute 0.8 0.0 - 0.9 thou/uL    Eosinophils Absolute 0.1 0.0 - 0.4 thou/uL    Basophils Absolute 0.1 0.0 - 0.2 thou/uL             Imaging    No results found.      Signed by: Ashia Kahn MD

## 2021-06-02 NOTE — PROGRESS NOTES
[facility] Hematology and Oncology Progress Note    Patient: Glenis Pop  MRN: [id number]  Date of Service: 10/09/2019        Reason for Visit:    D1C3 palliative FOLFIRINOX chemotherapy + Neulasta support.    Assessment and Plan:    Cancer Staging    1. 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     Presented with mild abdominal discomfort and backache for ~2 months.      08/01/19 CT CAP - numerous liver mets, a 35 mm mass in the body of the pancreas, upper abdominal lymph nodes that were enlarged and a left renal lesion that appears to be metastasis.      08/15/19 ultrasound-guided core needle biopsy from 1 of the liver mets - poorly differentiated adenocarcinoma consistent with a pancreatic primary.     08/27/19 CEA level was slightly elevated at 4.4.      08/27/19 CA-19-9 was grossly elevated at more than 22,000.      08/30/19 NM PET - mass in the pancreas is PET positive.  Numerous metastasis are seen in the liver, lungs and abdominal lymph nodes.  The left abdominal lesion is not lighting up so it appears to be an adenoma.  The unexpected finding is a PET positive nodule in the proximal ascending colon.  This would be an unusual site of metastasis for the pancreas cancer.  It could be an advanced colon polyp or even a separate colon cancer.    Opted to monitor PET positive nodule in the colon since she already has widely metastatic pancreas cancer.  Most of the chemotherapy is for pancreas cancer should be effective for colon cancer also in case this is a form of cancer.      Palliative FOLFIRINOX chemotherapy was recommended starting at 80% dosing.      09/03/19 IR port-a-cath placed.    10/08/19 - C1 palliative FOLFIRINOX chemotherapy at 80% dosing + Neulasta.    09/25/19 - C2 palliative FOLFIRINOX chemotherapy at 90% dosing + Neulasta.     10/09/19:    CBC - HgB stable @ 11.2.  WBC and ANC slightly high.  Plts WNL.    CMP - mildly chronically elevated AST  and Alk Phos.  Worsening hypoalbuminemia.    Taking 3 protein drinks/day. Encouraged to increase.    Magnesium - WNL.    CA19.9 - pending (20525, 00845 and 53535 on prior serial evaluations).    Glenis presents feeling quite good and in good spirits accompanied by her daughter.  She tolerated 10% dose increase with C2 chemotherapy without incident.  Constipation has resolved.  No nausea - no antiemetics.  Appetite OK - 5+ pound weight loss since start of treatment.  Abdominal and back pain resolved - no recent Tylenol.  She lives alone but has family who will be staying with her as necessary.  No neuropathies.    I reviewed potential/likely side effects palliative FOLFIRINOX chemotherapy.    Proceed D1C3 palliative FOLFIRINOX chemotherapy.  Since tolerated increase 80 to 90% dosing without noting any additional side effects, will increase to 100% dosing + Neulasta.     I encouraged her to be proactive in treating any nausea.    She was instructed that if she would note a fever > 100.4F the Cancer clinic needs to be called immediately day or night as inpatient admission and antibiotics may be needed.    I reviewed options for treating mild constipation including stool softeners, increased fiber, activity and increased fluids.    Referral to genetic counselor.  Purpose is twofold;     If she has a BRCA mutation we can consider treatment with a pop inhibitor if she has a good response to the initial chemotherapy.    If she is found to have a germline mutation that is pathogenic it would be helpful for her family since they can take measures to detect the cancer earlier or prevent it.      Referral to Dietary.    10/23/19 - follow up D1C4 palliative FOLFIRINOX chemotherapy at 100% dosing + Neulasta.     CA-19-9 level with each cycle.    After 4 cycles (approximately 2 months of treatment) we will repeat a NM PET scan or CT scan to see whether she is responding to chemotherapy.      ECOG Performance:     ECOG Performance  Status: 0    Distress Assessment:    Distress Assessment Score: 1    Pain:    Pain Score (Initial OR Reassessment): No/Denies Pain        TT > 25 minutes face to face with > 50 % in counseling and coordination of care.    Leonidas Adams, CNP      CC: Austin Mendoza MD  __________________________________    Interim History:    Ms. Glenis Pop presents anticipating C3 palliative chemotherapy accompanied by her daughter.  She looks and feels quite good.  She tolerated C2 with dose increase without incident.  Her prior constipation has resolved.  No nausea - no antiemetics.  Appetite OK but 5+ pound weight loss since start of treatment.  Abdominal and back pain resolved - no recent Tylenol.  No neuropathies.  She denies cough, fever, chills, unusual headaches, visual or mentation disturbance, bladder issues, rash.  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  She lives alone but has family who will be staying with her as necessary.      Pain Status:    Currently in Pain: No/denies    Review of Systems    Constitutional  Constitutional (WDL): Exceptions to WDL(the last week has felt good, just tired after chemo)  Fatigue: Fatigue relieved by rest  Neurosensory  Neurosensory (WDL): All neurosensory elements are within defined limits  Cardiovascular  Cardiovascular (WDL): All cardiovascular elements are within defined limits  Edema: No  Pulmonary  Respiratory (WDL): Within Defined Limits  Gastrointestinal  Gastrointestinal (WDL): Exceptions to WDL  Anorexia: Loss of appetite without alteration in eating habits(getting better)  Dysgeusia: Altered taste but no change in diet  Dry Mouth: Symptomatic (e.g., dry or thick saliva) without significant dietary alteration, unstimulated saliva flow >0.2 ml/min  Genitourinary  Genitourinary (WDL): All genitourinary elements are within defined limits  Integumentary  Integumentary (WDL): All integumentary elements  are within defined limits  Patient Coping  Patient Coping: Accepting;Open/discussion  Distress Assessment  Distress Assessment Score: 1  Accompanied by  Accompanied by: Family Member    Past History:    Past Medical History:   Diagnosis Date     Endometriosis      Hemorrhoids      Pancreatic cancer (H)          Past Surgical History:   Procedure Laterality Date     IR PORT PLACEMENT >5 YEARS  9/3/2019    Right IJ port.     PERCUTANEOUS LIVER BIOPSY  08/15/2019     TOTAL ABDOMINAL HYSTERECTOMY W/ BILATERAL SALPINGOOPHORECTOMY  1980       Physical Exam:    Recent Vitals 9/27/2019   Height -   Weight -   BSA (m2) -   /81   Pulse 88   Temp 98.8   Temp src 1   SpO2 96   Some recent data might be hidden       GENERAL:   Alert and oriented.  Comfortable.  In no acute distress.  Slightly anxious.  Daughter accompanies.    HEENT:   Atraumatic and normocephalic. GAYATHRI.  EOMI.  No pallor.  No icterus.  No mucosal lesions.    LYMPH NODES:  No palpable cervical, axillary or inguinal lymphadenopathy.    CHEST:   Lungs clear to auscultation bilaterally.    Port-A-Cath over the right upper chest looks unremarkable.    CVS:    S1 and S2 heard.  Regular rate and rhythm.  No murmur, gallop or rub heard.  No peripheral edema.    ABDOMEN:   Soft.  Not tender.  Mildly distended - palpable hepatomegaly in the epigastrium.  Bowel sounds heard.    EXTREMITIES:  Warm.  No SAM.    SKIN:    No rash, bruising or purpura noted.  Full head of hair.      Lab Results:    Reviewed with patient.    Recent Results (from the past 24 hour(s))   Magnesium   Result Value Ref Range    Magnesium 2.0 1.8 - 2.6 mg/dL   Comprehensive Metabolic Panel   Result Value Ref Range    Sodium 136 136 - 145 mmol/L    Potassium 4.1 3.5 - 5.0 mmol/L    Chloride 99 98 - 107 mmol/L    CO2 24 22 - 31 mmol/L    Anion Gap, Calculation 13 5 - 18 mmol/L    Glucose 155 (H) 70 - 125 mg/dL    BUN 13 8 - 22 mg/dL    Creatinine 0.68 0.60 - 1.10 mg/dL    GFR MDRD Af Amer >60 >60  mL/min/1.73m2    GFR MDRD Non Af Amer >60 >60 mL/min/1.73m2    Bilirubin, Total 0.5 0.0 - 1.0 mg/dL    Calcium 9.3 8.5 - 10.5 mg/dL    Protein, Total 7.2 6.0 - 8.0 g/dL    Albumin 2.9 (L) 3.5 - 5.0 g/dL    Alkaline Phosphatase 413 (H) 45 - 120 U/L    AST 40 0 - 40 U/L    ALT 29 0 - 45 U/L   HM1 (CBC with Diff)   Result Value Ref Range    WBC 12.3 (H) 4.0 - 11.0 thou/uL    RBC 3.78 (L) 3.80 - 5.40 mill/uL    Hemoglobin 11.2 (L) 12.0 - 16.0 g/dL    Hematocrit 35.1 35.0 - 47.0 %    MCV 93 80 - 100 fL    MCH 29.6 27.0 - 34.0 pg    MCHC 31.9 (L) 32.0 - 36.0 g/dL    RDW 15.6 (H) 11.0 - 14.5 %    Platelets 237 140 - 440 thou/uL    MPV 8.5 8.5 - 12.5 fL    Neutrophils % 79 (H) 50 - 70 %    Lymphocytes % 10 (L) 20 - 40 %    Monocytes % 7 2 - 10 %    Eosinophils % 1 0 - 6 %    Basophils % 1 0 - 2 %    Neutrophils Absolute 9.7 (H) 2.0 - 7.7 thou/uL    Lymphocytes Absolute 1.3 0.8 - 4.4 thou/uL    Monocytes Absolute 0.8 0.0 - 0.9 thou/uL    Eosinophils Absolute 0.2 0.0 - 0.4 thou/uL    Basophils Absolute 0.1 0.0 - 0.2 thou/uL         Imaging:    No results found.

## 2021-06-02 NOTE — PROGRESS NOTES
Pt presented for labs, provider visit and poss chemo for pancreas cancer with liver mets. Carli Kerr RN

## 2021-06-02 NOTE — PROGRESS NOTES
"Pt here today for treatment following MD visit. She tolerated infusion well with just some \"gas pain\" near the end of the infusion. CADD pump connected at 1615, she was instructed to RTC at 1415 on Friday for pump dc. She left clinic ambulatory with daughter.   "

## 2021-06-03 VITALS
WEIGHT: 150.9 LBS | BODY MASS INDEX: 23.63 KG/M2 | DIASTOLIC BLOOD PRESSURE: 75 MMHG | SYSTOLIC BLOOD PRESSURE: 142 MMHG | OXYGEN SATURATION: 95 % | TEMPERATURE: 98.1 F | HEART RATE: 87 BPM

## 2021-06-03 VITALS
BODY MASS INDEX: 21.63 KG/M2 | HEART RATE: 111 BPM | OXYGEN SATURATION: 97 % | DIASTOLIC BLOOD PRESSURE: 79 MMHG | SYSTOLIC BLOOD PRESSURE: 139 MMHG | WEIGHT: 138.1 LBS | TEMPERATURE: 98 F

## 2021-06-03 VITALS — BODY MASS INDEX: 22.13 KG/M2 | WEIGHT: 141 LBS | HEIGHT: 67 IN

## 2021-06-03 VITALS — WEIGHT: 153 LBS | BODY MASS INDEX: 24.01 KG/M2 | HEIGHT: 67 IN

## 2021-06-03 VITALS
DIASTOLIC BLOOD PRESSURE: 72 MMHG | BODY MASS INDEX: 22.66 KG/M2 | OXYGEN SATURATION: 96 % | SYSTOLIC BLOOD PRESSURE: 118 MMHG | HEART RATE: 102 BPM | TEMPERATURE: 98.5 F | WEIGHT: 144.7 LBS

## 2021-06-03 VITALS — HEIGHT: 67 IN | WEIGHT: 154.7 LBS | BODY MASS INDEX: 24.28 KG/M2

## 2021-06-03 VITALS
HEART RATE: 111 BPM | BODY MASS INDEX: 22.32 KG/M2 | DIASTOLIC BLOOD PRESSURE: 80 MMHG | WEIGHT: 142.5 LBS | SYSTOLIC BLOOD PRESSURE: 138 MMHG | OXYGEN SATURATION: 98 % | TEMPERATURE: 98 F

## 2021-06-03 VITALS
OXYGEN SATURATION: 97 % | BODY MASS INDEX: 22.44 KG/M2 | HEART RATE: 119 BPM | WEIGHT: 143.3 LBS | SYSTOLIC BLOOD PRESSURE: 140 MMHG | TEMPERATURE: 98.2 F | DIASTOLIC BLOOD PRESSURE: 83 MMHG

## 2021-06-03 VITALS
HEIGHT: 67 IN | BODY MASS INDEX: 24.06 KG/M2 | HEART RATE: 97 BPM | SYSTOLIC BLOOD PRESSURE: 147 MMHG | DIASTOLIC BLOOD PRESSURE: 82 MMHG | OXYGEN SATURATION: 96 % | WEIGHT: 153.3 LBS

## 2021-06-03 NOTE — PROGRESS NOTES
Patient is here for two week follow-up of pancreatic cancer. Due D1C7 FOLFIRINOX pending labs/OV with Dr. Kahn.  Accompanied by daughter, Mona.   Yesenia Thornton RN

## 2021-06-03 NOTE — PROGRESS NOTES
[facility] Hematology and Oncology Progress Note    Patient: Glenis Pop  MRN: [id number]  Date of Service: 11/20/2019        Reason for Visit:    D1C6 palliative FOLFIRINOX chemotherapy + Neulasta support.    Assessment and Plan:    Cancer Staging    1. 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     Presented with mild abdominal discomfort and backache for ~2 months.      08/01/19 CT CAP - numerous liver mets, a 35 mm mass in the body of the pancreas, upper abdominal lymph nodes that were enlarged and a left renal lesion that appears to be metastasis.      08/15/19 ultrasound-guided core needle biopsy from 1 of the liver mets - poorly differentiated adenocarcinoma consistent with a pancreatic primary.     08/27/19 CEA level was slightly elevated at 4.4.      08/27/19 CA-19-9 was grossly elevated at more than 22,000.      08/30/19 NM PET - mass in the pancreas is PET positive.  Numerous metastasis are seen in the liver, lungs and abdominal lymph nodes.  The left abdominal lesion is not lighting up so it appears to be an adenoma.  The unexpected finding is a PET positive nodule in the proximal ascending colon.  This would be an unusual site of metastasis for the pancreas cancer.  It could be an advanced colon polyp or even a separate colon cancer.    Opted to monitor PET positive nodule in the colon since she already has widely metastatic pancreas cancer.  Most of the chemotherapy is for pancreas cancer should be effective for colon cancer also in case this is a form of cancer.      Palliative FOLFIRINOX chemotherapy was recommended starting at 80% dosing.      09/03/19 IR port-a-cath placed.    09/11/19 - C1 palliative FOLFIRINOX chemotherapy at 80% dosing + Neulasta.    09/25/19 - C2 palliative FOLFIRINOX chemotherapy at 90% dosing + Neulasta.     10/09/19 - C3 palliative FOLFIRINOX chemotherapy at 100% dosing + Neulasta.     10/23/19 - C4 palliative FOLFIRINOX  "chemotherapy at 100% dosing + Neulasta.     11/04/19 NM PET - Partial response to therapy with decreased size of the pancreatic body adenocarcinoma, liver metastasis, and upper abdominal lymph node metastasis. Unchanged bilateral pulmonary metastasis. Persistent focal FDG uptake in the ascending colon/cecum, 20% of which may represent benign/malignant polyps.    11/06/19 - C5 palliative FOLFIRINOX chemotherapy @ 100% dosing + Neulasta.     11/20/19:    CBC - HgB slowly dropping @ 10.5.  WBC and ANC WNL.  Plts 139K.    CMP - Alk Phos dropping.  Other LFTs WNL.  Worsening albuminemia. Mildly elevated non-fasting BS.  Otherwise WNL.    Continue 3 Premier protein drinks/day each with 30 G protein.   Also eating high protein foods.    Magnesium - 1.7.    Will give 4 grams IVPB today.    CA19.9 - pending (25,657, 34,820, 98917, 92797, 35521 and 03398 on prior serial evaluations).    Glenis presents feeling quite good and in good spirits accompanied by her daughter, stating \"I haven't felt this good in a long time\".  She's been tolerating 100% FOLFIRINOX chemotherapy dosing since C3 without incident.  No constipation or diarrhea.  No nausea - no antiemetics.  Appetite good, weight now quite stable after ~10 pound weight loss from start of treatment ... stable since C2.  Abdominal and back pain resolved - no recent Tylenol.  She lives alone but has family who will be staying with her as necessary.  No neuropathies.  Stable cold hypersensitivity to frozen food for 3-4 days post each treatment, then mostly resolves.      NM PET, biochemical marker and alk phos all show treatment response.    I reviewed potential/likely side effects palliative FOLFIRINOX chemotherapy.    Proceed D1C6 palliative FOLFIRINOX chemotherapy @ 100% dosing + Neulasta.     I encouraged her to be proactive in treating any nausea.    She was instructed that if she would note a fever > 100.4F the Cancer clinic needs to be called immediately day or night as " inpatient admission and antibiotics may be needed.    11/21/19 - appointment with genetic counselor.  Purpose is two fold;     If she has a BRCA mutation we can consider treatment with a pop inhibitor if she has a good response to the initial chemotherapy.    If she is found to have a germline mutation that is pathogenic it would be helpful for her family since they can take measures to detect the cancer earlier or prevent it.      12/04/19 - follow up D1C7 palliative FOLFIRINOX chemotherapy + Neulasta.     CA-19-9 level with each cycle.    2nd line therapy with Gemcitabine and Abraxane chemotherapy.      ECOG Performance:     ECOG Performance Status: 1    Distress Assessment:    Distress Assessment Score: 4    Pain:    Pain Score (Initial OR Reassessment): No/Denies Pain        TT > 25 minutes face to face with > 50 % in counseling and coordination of care.    Leonidas Adams, CNP      CC: Austin Mendoza MD  __________________________________    Interim History:    Ms. Glenis Pop presents anticipating C6 palliative chemotherapy accompanied by her daughter.  She looks and feels quite good.  She's been tolerating 100% FOLFIRINOX chemotherapy dosing since C3 without incident.  She states we could even go higher.  No constipation or diarrhea.  No nausea - no antiemetics.  Appetite good and weight now stable after ~10 pound weight loss since start of treatment ... stable since C2.  Abdominal and back pain resolved - no recent Tylenol.  Stable cold sensitivity to frozen food.  No other neuropathies.  She denies cough, fever, chills, unusual headaches, visual or mentation disturbance, bladder issues, rash.  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  She lives alone but has family who will be staying with her as necessary.      Pain Status:    Currently in Pain: No/denies    Review of Systems    Constitutional  Constitutional (WDL): Exceptions to  WDL  Fatigue: Fatigue relieved by rest  Neurosensory  Neurosensory (WDL): Exceptions to WDL  Peripheral Sensory Neuropathy: Asymptomatic, loss of deep tendon reflexes or paresthesia(cold sensitivity on fingertips)  Cardiovascular  Cardiovascular (WDL): All cardiovascular elements are within defined limits  Pulmonary  Respiratory (WDL): Within Defined Limits  Gastrointestinal  Gastrointestinal (WDL): All gastrointestinal elements are within defined limits  Dry Mouth: Symptomatic (e.g., dry or thick saliva) without significant dietary alteration, unstimulated saliva flow >0.2 ml/min  Genitourinary  Genitourinary (WDL): All genitourinary elements are within defined limits  Integumentary  Integumentary (WDL): Exceptions to WDL  Palmar-Plantar Erythrodysesthesia Syndrome: Minimal skin changes or dermatitis (e.g., erythema, edema, or hyperkeratosis) without pain(hands only)  Patient Coping  Patient Coping: Accepting;Open/discussion  Distress Assessment  Distress Assessment Score: 4  Accompanied by  Accompanied by: Family Member    Past History:    Past Medical History:   Diagnosis Date     Endometriosis      Hemorrhoids      Pancreatic cancer (H)          Past Surgical History:   Procedure Laterality Date     IR PORT PLACEMENT >5 YEARS  9/3/2019    Right IJ port.     PERCUTANEOUS LIVER BIOPSY  08/15/2019     TOTAL ABDOMINAL HYSTERECTOMY W/ BILATERAL SALPINGOOPHORECTOMY  1980       Physical Exam:    Recent Vitals 11/20/2019   Height -   Weight 140 lbs 2 oz   BSA (m2) 1.73 m2   /72   Pulse 109   Temp 98.3   Temp src 1   SpO2 96   Some recent data might be hidden       GENERAL:   Pleasant.  Alert and oriented.  Comfortable.  In no acute distress.  Daughter accompanies.    HEENT:   Atraumatic and normocephalic. GAYATHRI.  EOMI.  No pallor.  No icterus.  No mucosal lesions.    LYMPH NODES:  No cervical, axillary or inguinal lymphadenopathy palpable.    CHEST:   Lungs clear to auscultation bilaterally.    Port-A-Cath over the  right upper chest looks unremarkable.    CVS:    S1 and S2 heard.  RRR.  No murmur, gallop or rub heard.  No peripheral edema.    ABDOMEN:   Soft.  Not tender.  Mildly distended - palpable hepatomegaly in the epigastrium.  Bowel sounds heard.    EXTREMITIES:  Warm.  No SAM.    SKIN:    No rash, bruising or purpura noted.  Thinning, but full head of hair.      Lab Results:    Reviewed with patient.    Recent Results (from the past 24 hour(s))   Magnesium   Result Value Ref Range    Magnesium 1.7 (L) 1.8 - 2.6 mg/dL   Comprehensive Metabolic Panel   Result Value Ref Range    Sodium 139 136 - 145 mmol/L    Potassium 3.5 3.5 - 5.0 mmol/L    Chloride 101 98 - 107 mmol/L    CO2 21 (L) 22 - 31 mmol/L    Anion Gap, Calculation 17 5 - 18 mmol/L    Glucose 192 (H) 70 - 125 mg/dL    BUN 13 8 - 22 mg/dL    Creatinine 0.69 0.60 - 1.10 mg/dL    GFR MDRD Af Amer >60 >60 mL/min/1.73m2    GFR MDRD Non Af Amer >60 >60 mL/min/1.73m2    Bilirubin, Total 0.5 0.0 - 1.0 mg/dL    Calcium 9.2 8.5 - 10.5 mg/dL    Protein, Total 6.6 6.0 - 8.0 g/dL    Albumin 2.9 (L) 3.5 - 5.0 g/dL    Alkaline Phosphatase 232 (H) 45 - 120 U/L    AST 22 0 - 40 U/L    ALT 15 0 - 45 U/L   HM1 (CBC with Diff)   Result Value Ref Range    WBC 7.5 4.0 - 11.0 thou/uL    RBC 3.39 (L) 3.80 - 5.40 mill/uL    Hemoglobin 10.5 (L) 12.0 - 16.0 g/dL    Hematocrit 32.5 (L) 35.0 - 47.0 %    MCV 96 80 - 100 fL    MCH 31.0 27.0 - 34.0 pg    MCHC 32.3 32.0 - 36.0 g/dL    RDW 19.6 (H) 11.0 - 14.5 %    Platelets 139 (L) 140 - 440 thou/uL    MPV 8.9 8.5 - 12.5 fL    Neutrophils % 75 (H) 50 - 70 %    Lymphocytes % 12 (L) 20 - 40 %    Monocytes % 10 2 - 10 %    Eosinophils % 1 0 - 6 %    Basophils % 1 0 - 2 %    Neutrophils Absolute 5.6 2.0 - 7.7 thou/uL    Lymphocytes Absolute 0.9 0.8 - 4.4 thou/uL    Monocytes Absolute 0.8 0.0 - 0.9 thou/uL    Eosinophils Absolute 0.1 0.0 - 0.4 thou/uL    Basophils Absolute 0.0 0.0 - 0.2 thou/uL       Imaging:    No new imaging.

## 2021-06-03 NOTE — PATIENT INSTRUCTIONS - HE
Recent Results (from the past 24 hour(s))   Magnesium   Result Value Ref Range    Magnesium 1.7 (L) 1.8 - 2.6 mg/dL   Comprehensive Metabolic Panel   Result Value Ref Range    Sodium 139 136 - 145 mmol/L    Potassium 3.5 3.5 - 5.0 mmol/L    Chloride 101 98 - 107 mmol/L    CO2 21 (L) 22 - 31 mmol/L    Anion Gap, Calculation 17 5 - 18 mmol/L    Glucose 192 (H) 70 - 125 mg/dL    BUN 13 8 - 22 mg/dL    Creatinine 0.69 0.60 - 1.10 mg/dL    GFR MDRD Af Amer >60 >60 mL/min/1.73m2    GFR MDRD Non Af Amer >60 >60 mL/min/1.73m2    Bilirubin, Total 0.5 0.0 - 1.0 mg/dL    Calcium 9.2 8.5 - 10.5 mg/dL    Protein, Total 6.6 6.0 - 8.0 g/dL    Albumin 2.9 (L) 3.5 - 5.0 g/dL    Alkaline Phosphatase 232 (H) 45 - 120 U/L    AST 22 0 - 40 U/L    ALT 15 0 - 45 U/L   HM1 (CBC with Diff)   Result Value Ref Range    WBC 7.5 4.0 - 11.0 thou/uL    RBC 3.39 (L) 3.80 - 5.40 mill/uL    Hemoglobin 10.5 (L) 12.0 - 16.0 g/dL    Hematocrit 32.5 (L) 35.0 - 47.0 %    MCV 96 80 - 100 fL    MCH 31.0 27.0 - 34.0 pg    MCHC 32.3 32.0 - 36.0 g/dL    RDW 19.6 (H) 11.0 - 14.5 %    Platelets 139 (L) 140 - 440 thou/uL    MPV 8.9 8.5 - 12.5 fL    Neutrophils % 75 (H) 50 - 70 %    Lymphocytes % 12 (L) 20 - 40 %    Monocytes % 10 2 - 10 %    Eosinophils % 1 0 - 6 %    Basophils % 1 0 - 2 %    Neutrophils Absolute 5.6 2.0 - 7.7 thou/uL    Lymphocytes Absolute 0.9 0.8 - 4.4 thou/uL    Monocytes Absolute 0.8 0.0 - 0.9 thou/uL    Eosinophils Absolute 0.1 0.0 - 0.4 thou/uL    Basophils Absolute 0.0 0.0 - 0.2 thou/uL

## 2021-06-03 NOTE — PATIENT INSTRUCTIONS - HE
EXAM: NM PET CT SKULL TO MID THIGH  LOCATION: [facility]  DATE/TIME: 11/4/2019 2:59 PM     INDICATION: Subsequent treatment planning and restaging for malignant neoplasm of body of pancreas. Status post 4 cycles of chemotherapy FOLFIRINOX. Monitor treatment response.  COMPARISON: FDG PET/CT dated 08/30/2019 and CT of the abdomen and pelvis dated 08/01/2019  TECHNIQUE: Serum glucose level 127 mg/dL. One hour post intravenous administration of 9.3 mCi F-18 FDG, PET imaging was performed from the skull base to the mid thighs utilizing attenuation correction with concurrent axial CT and PET/CT image fusion.   Dose reduction techniques were used.     FINDINGS: Interval decrease in size and metabolic activity of the ill-defined FDG avid mass in the body of the pancreas (max SUV 5.6, previously 8.6), numerous liver lesions (Max SUV 4.9 a new lesion in the posterior right hepatic lobe, previously   measured 5.8), and upper abdominal lymph nodes with mildly FDG avid persistent right hepatus lymph node (max SUV 3.5, previously 6.8). Redemonstrated probable bilateral pulmonary metastasis.       Diffuse FDG uptake within the axial and appendicular skeleton in a pattern typical of marrow stimulation. Persistent focal FDG uptake in the ascending colon/cecum, 20% of which may represent benign/malignant polyps (max SUV 9.9, previously 18.1).      mucosal thickening in the right maxillary sinus. Moderate carotid artery bifurcation calcification. Moderate coronary artery calcification. Right chest port with tip terminating near the cavoatrial junction. Left adrenal adenoma. Sigmoid diverticula.   Pelvic phleboliths. Hysterectomy. Mild scattered degenerative changes in the spine. Benign vertebral body hemangiomas T9 and T12.     IMPRESSION:      1. Partial response to therapy with decreased size of the pancreatic body adenocarcinoma, liver metastasis, and upper abdominal lymph node metastasis.     2. Unchanged bilateral  pulmonary metastasis.     3. Persistent focal FDG uptake in the ascending colon/cecum, 20% of which may represent benign/malignant polyps

## 2021-06-03 NOTE — PROGRESS NOTES
Pt and daughter, Mona amb into clinic for chemo infusion. Reviewed plan of care. Pt received mag sulf infusion for serum mag 1.7. Pt premedicated. Pt tolerated infusions very well. Care assumed by Eli ABURTO

## 2021-06-03 NOTE — PROGRESS NOTES
11/21/2019    Referring Provider: Leonidas Adams, CNP    Present for Today's Visit: Glenis and her daughter, Doris    Presenting Information:   I met with Glenis Pop today for genetic counseling at [facility] to discuss her recent diagnosis of pancreatic cancer and family history of breast cancer.  She is here today to review this history, cancer screening recommendations, and available genetic testing options.    Personal History:  Glenis is a 69 y.o. female. She was diagnosed with a stage 4 pancreatic cancer as the result of abdominal pain and discomfort. She has metastatic disease to her liver, lungs, and abdominal lymph nodes. Biopsy of a liver met completed on 08/15/2019 revealed a poorly differentiated adenocarcinoma consistent with a pancreatic primary. She is currently being followed by Dr. Kahn and is undergoing chemotherapy. A mass in her colon was also noted as part of her work-up (metastatses vs second primary unknown) and treatment is being deferred to treatment of her pancreatic cancer.     Her surgical history includes a total abdominal hysterectomy with bilateral salping-oophorectomy at age 30 due to endometriosis.      She had her first menstrual period at age 14, her first child at age 27, and went through menopause at age 30 as the result of her gynecologic surgery.  Glenis is s/p BRIDGER-BSO as mentioned above.  She reports no history of oral contraceptive use and that she has never been on hormone replacement therapy.      She has no received pap smears since age 30 as her cervix was removed at that time. Glenis reports that her most recent mammogram was over 5 years ago. She does recall a biopsy being completed in the past the returned benign. She has never had a colonoscopy. She does not regularly do any other cancer screening at this time.  Glenis reported no tobacco use, and no alcohol use.    Family History: (Please see scanned pedigree for detailed family history  information)    Glenis's sister, age 78, has a history of breast cancer in her early 30's. Glenis reports that this sister had her ovaries removed due to endometriosis at an unknown age.     Glenis's sister passed away at age 75 with a history of breast cancer in her early 30's.    Gelnis's father passed away at age 76 from lung cancer diagnosed in his 70's. Glenis reports that he was a smoker and likely had asbestos exposure due to his occupation. Glenis also reported that her father had a history of a melanoma on his forehead in his 60's.     Glenis's paternal uncle passed away in his late 70's with a history of a lip cancer at an unknown age. Glenis reports that this uncle was a smoker.     Glenis's paternal grandmother had a sister who had a history of breast cancer in her 70's.    Her maternal ethnicity is Malaysian. Her paternal ethnicity is Malaysian. Glenis also reported that she has English and Icelandic ancestry, although she is unsure of which side of the family this comes from. There is no known Ashkenazi Voodoo ancestry on either side of her family. There is no reported consanguinity.    Discussion:    Glenis's personal history of pancreatic cancer and family history of breast cancer is suggestive of a hereditary cancer syndrome.    We reviewed the features of sporadic, familial, and hereditary cancers. In looking at Glenis's family history, it is possible that a cancer susceptibility gene is present due to her pancreatic cancer and her sisters' early-onset breast cancers.    We discussed the natural history and genetics of hereditary breast and pancreatic cancers. A detailed handout regarding the information we discussed was provided to Glenis at the end of our appointment today and can be found in the after visit summary. Topics included: inheritance pattern, cancer risks, cancer screening recommendations, and also risks, benefits and limitations of testing.    We discussed the genes associated with increased pancreatic cancer risk  including but not limited to BRCA1/2, Radford syndrome,Peutz Jeghers syndrome, and Familial Atypical Multiple Mole Melanoma.     We reviewed that the most common cause of hereditary breast cancer is Hereditary Breast and Ovarian Cancer (HBOC) syndrome, which is caused by mutations in the genes BRCA1 and BRCA2. BRCA1 and BRCA2 are two genes that increase the risk for breast and ovarian cancers, among others. Women who inherit a BRCA mutation have a 50 to 85% lifetime risk of breast cancer and a 15 to 45% lifetime risk of ovarian cancer. This is higher than the general population lifetime risks of 12% for breast cancer and less than 2% for ovarian cancer. Men with BRCA gene mutations have up to a 7% risk of breast cancer and 20% risk of prostate cancer. Other cancers, such as pancreatic cancer and melanoma, have also been associated with BRCA mutations.    Radford syndrome can be caused by a mutation in one of five genes:  MLH1, MSH2, MSH6, PMS2, and EPCAM.  Radford syndrome may present with polyps, but typically a small number.  The highest cancer risks associated with Radford syndrome include colon cancer, endometrial/uterine cancer, gastric cancer, and ovarian cancer.    Peutz-Jeghers syndrome (PJS) is caused by mutation in the STK11 gene. Individuals with PJS are predisposed to developing specific polyps (hamartomas), especially in the small intestine and often presenting in childhood. In addition, PJS is associated with an increased risk for female breast cancer, colon cancer, stomach cancer, and pancreatic cancer.     Familial Atypical Multiple Mole Melanoma Syndrome (FAMMM) is caused by a single mutation in the CDKN2A gene.  The lifetime risk for melanoma is between 58-92% (PMID 44311946, PMID [id number]). Some studies indicate that these risks may vary, due to different factors. Individuals with FAMMM typically have many moles (more than 50), which can be atypical. People with FAMMM have increased risks for pancreatic  cancer, and possibly other cancers. The exact risk of pancreatic cancer can depend on a person s specific CDKN2A mutation, but has been reported as about 17% by age 75 by one larger study (PMID 15529726). We discussed screening for melanoma (frequent skin exams) and pancreatic cancer (endoscopic ultrasonography (EUS) and/or MRI/magnetic resonance.    Based on her personal and family history, Glenis meets current National Comprehensive Cancer Network (NCCN) criteria for genetic testing of BRCA1 and BRCA2.      We discussed that there are additional genes that could cause increased risk for breast and pancreatic cancer. As many of these genes present with overlapping features in a family and accurate cancer risk cannot always be established based upon the pedigree analysis alone, it would be reasonable for Glenis to consider panel genetic testing to analyze multiple genes at once.    After our discussion, Glenis opted to proceed with testing via the CustomNext-Cancer (CancerNext +AXIN2, MSH3, NTHL1) panel through [facility].   Genetic testing is available for 37 genes associated with hereditary cancer: CustomNext-Cancer (APC, TRISHA, AXIN2, BARD1, BRCA1, BRCA2, BRIP1, BMPR1A, CDH1, CDK4, CDKN2A, CHEK2, DICER1, EPCAM, GREM1, HOXB13, MLH1, MRE11A, MSH2, MSH3, MSH6, MUTYH, NBN, NF1, NTHL1, PALB2, PMS2, POLD1, POLE, PTEN, RAD50, RAD51C, RAD51D, SMAD4, SMARCA4, STK11, and TP53).  We discussed that many of the genes in the CustomNext-Cancer panel are associated with specific hereditary cancer syndromes and published management guidelines: Hereditary Breast and Ovarian Cancer syndrome (BRCA1, BRCA2), Radford syndrome (MLH1, MSH2, MSH6, PMS2, EPCAM), Familial Adenomatous Polyposis (APC), Hereditary Diffuse Gastric Cancer (CDH1), Familial Atypical Multiple Mole Melanoma syndrome (CDK4, CDKN2A), Juvenile Polyposis syndrome (BMPR1A, SMAD4), Cowden syndrome (PTEN), Li Fraumeni syndrome (TP53), Peutz-Jeghers syndrome (STK11), MUTYH  Associated Polyposis (MUTYH), and Neurofibromatosis type 1 (NF1).   The TRISHA, BRIP1, CHEK2, GREM1, NBN, PALB2, POLD1, POLE, RAD51C, and RAD51D genes are associated with increased cancer risk and have published management guidelines for certain cancers.    The remaining genes (BARD1, DICER1, HOXB13, MRE11A, RAD50, and SMARCA4) are associated with increased cancer risk and may allow us to make medical recommendations when mutations are identified.    Consent was obtained and genetic testing for CustomNext-Cancer was sent to [facility].    Medical Management: For Glenis, we reviewed that the information from genetic testing may determine:    additional cancer screening for which Glenis may qualify (i.e. mammogram and breast MRI, more frequent colonoscopies, more frequent dermatologic exams, etc.),    options for risk reducing surgeries Glenis could consider (i.e. bilateral mastectomy, etc.),      and targeted chemotherapies for Glenis's active cancer, or if she were to develop certain cancers in the future (i.e. immunotherapy for individuals with Radford syndrome, PARP inhibitors, etc.).     These recommendations and possible targeted chemotherapies will be discussed in detail once genetic testing is completed.    We discussed that Glenis's treatment planning is pending genetic testing results. For that reason, I will place a STAT request on the BRCAPlus genes (TRISHA, BRCA1, BRCA2, CDH1, CHEK2, PALB2, PTEN, and TP53) in order to get these results back as soon as possible.      Plan:  1) Today Glenis elected to proceed with CustomNext-Cancer genetic testing (37 genes) through [facility].  2) A STAT request was placed on the BRCAPlus genes (including BRCA1 and BRCA2) and we will get these results back in about 2 weeks. I will call Glenis with these results as soon as they are available.   3) The results from the rest of the testing will be available in approximately 4 weeks.   4) Glenis will be contacted by our scheduling  department to set up a result disclosure appointment either over the phone or in person as soon as full results are available.       Face to face time: 55 minutes    Farheen Wise MS, [facility]  Licensed Genetic Counselor  [facility]  [phone]

## 2021-06-03 NOTE — TELEPHONE ENCOUNTER
Phone call to Glenis to discuss her genetic testing results. Glenis was not available, so I left a non-detailed voicemail with my contact information.    Farheen Wise MS, [facility]  Licensed Genetic Counselor  [facility]  [phone]

## 2021-06-03 NOTE — PROGRESS NOTES
[facility] Progress Note    Patient: Glenis oPp  MRN: [id number]  Date of Service: 12/04/2019        Reason for Visit    Follow-up regarding metastatic pancreas cancer.    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    Pain   : No pain now.    Ms. Glenis Pop is a 69 y.o. woman who presents with some complaints of mild abdominal discomfort and backache over 2 months.  CT scan of the abdomen and pelvis that was done on 8/1/2019 showed numerous liver mets, a 35 mm mass in the body of the pancreas, upper abdominal lymph nodes that were enlarged and a left renal lesion that appears to be metastasis.  She had an ultrasound-guided core needle biopsy from 1 of the liver mets on 8/15/2019 which showed a poorly differentiated adenocarcinoma consistent with a pancreatic primary.   The CEA level was slightly elevated at 4.4.  The CA-19-9 was grossly elevated at more than 22,000.  I reviewed the results with her.  The PET CT scan done on 8/30/2019showed the mass in the pancreas that is PET positive.  Numerous metastases are seen in the liver, lungs and abdominal lymph nodes.  The left abdominal lesion is not lighting up so it appears to be an adenoma.  The unexpected finding is a PET positive nodule in the proximal ascending colon.This could be an unusual site of metastasis for the pancreas cancer.  This could also turn out to be an advanced colon polyp or even a separate colon cancer.  We decided to watch it in the subsequent imaging studies.    She started chemotherapy with FOLFIRINOX on 9/11/2019.  The first cycle was given at 80% dose and with Neulasta support.   The second cycle was given at the 90% dose and we could go to 100% dose with cycle #3.    The PET CT scan that was done on 11/4/2019, after cycle #4 of chemotherapy, showed a partial response with decreased size of the pancreatic body adenocarcinoma, liver mets and upper abdominal lymph nodes.  The lung nodules appeared about the same.  There is still persistent uptake in the ascending colon.    1.  She has now completed 6 cycles of FOLFIRINOX chemotherapy.  Overall she appears to be tolerating it well.  In fact symptoms appear improved with no abdominal pain now.  It appears that the abdominal distention has also improved somewhat.  She is eating better.  Weight has gone up a little bit.  She is happy about how things are going.    2.  Reviewed her labs with her.  The CA-19-9 level is progressively coming down.  When we look at her blood counts, however we see that she has a slowly progressive anemia after she started chemotherapy.  The platelet count is also slowly coming down.  WBC count is normal with an ANC of 5.3.  Metabolic panel is stable.  I think the labs are good enough to proceed with cycle #7 of FOLFIRINOX chemotherapy at 100% dose today.  She will be receiving Neulasta after that.    3.  At this time she has no symptoms from the cecal lesion that is PET positive.  This could be a pancreatic cancer metastasis or it could be a second colon cancer.  We have decided to follow this clinically and if she has any symptoms, then we will go for a colonoscopy.    4.  I discussed with her about trying to increase the protein in her diet to improve the hypoalbuminemia.  For the anemia and hypoalbuminemia, I asked her to start taking an over-the-counter multivitamin tablet also.    5.  We will plan on obtaining another PET CT scan after cycle #8 of chemotherapy.    6.  We will give her 2 g of magnesium sulfate IV around today in the infusion room for a magnesium of 1.6.    7.  Return to clinic with MD or NP in 2  weeks when she is due for cycle #8 of chemotherapy with labs according to treatment plan.    Time spend >25 minutes face to face with the patient. More than 50 % in counseling and coordination of care.      Problem List    1. Malignant neoplasm of body of pancreas (H)  CC OFFICE VISIT LONG    NURSE VISIT    CC OFFICE VISIT LONG    Infusion Appointment    DISCONTINUED: sodium chloride 0.9% 250 mL infusion    DISCONTINUED: palonosetron injection 0.25 mg (ALOXI)    DISCONTINUED: fosaprepitant 150 mg, dexamethasone 12 mg in sodium chloride 0.9% 150 mL    DISCONTINUED: atropine injection 0.5 mg    DISCONTINUED: oxaliplatin 150 mg in dextrose 5% 250 mL (ELOXATIN)    DISCONTINUED: dextrose 5% 500 mL infusion    DISCONTINUED: irinotecan 320 mg in dextrose 5% 500 mL (CAMPTOSAR)    DISCONTINUED: leucovorin 700 mg in dextrose 5% 250 mL (WELLCOVORIN)    DISCONTINUED: fluorouracil injection 700 mg (ADRUCIL)    DISCONTINUED: fluorouracil 2,400 mg/m2 = 4,200 mg in sodium chloride 0.9% 242 mL (ADRUCIL)    DISCONTINUED: sodium chloride flush 20 mL (NS)    DISCONTINUED: heparin lockflush (PF) porcine 300-600 Units    DISCONTINUED: diphenhydrAMINE injection 50 mg (BENADRYL)    DISCONTINUED: famotidine 20 mg/2 mL injection 20 mg (PEPCID)    DISCONTINUED: hydrocortisone sod succ (PF) injection 100 mg    DISCONTINUED: acetaminophen tablet 1,000 mg (TYLENOL)    DISCONTINUED: sodium chloride 0.9% 500 mL   2. Encounter for antineoplastic chemotherapy  CC OFFICE VISIT LONG    NURSE VISIT    CC OFFICE VISIT LONG    Infusion Appointment    DISCONTINUED: sodium chloride 0.9% 250 mL infusion    DISCONTINUED: palonosetron injection 0.25 mg (ALOXI)    DISCONTINUED: fosaprepitant 150 mg, dexamethasone 12 mg in sodium chloride 0.9% 150 mL    DISCONTINUED: atropine injection 0.5 mg    DISCONTINUED: oxaliplatin 150 mg in dextrose 5% 250 mL (ELOXATIN)    DISCONTINUED: dextrose 5% 500 mL infusion    DISCONTINUED: irinotecan 320 mg in dextrose 5% 500 mL  (CAMPTOSAR)    DISCONTINUED: leucovorin 700 mg in dextrose 5% 250 mL (WELLCOVORIN)    DISCONTINUED: fluorouracil injection 700 mg (ADRUCIL)    DISCONTINUED: fluorouracil 2,400 mg/m2 = 4,200 mg in sodium chloride 0.9% 242 mL (ADRUCIL)    DISCONTINUED: sodium chloride flush 20 mL (NS)    DISCONTINUED: heparin lockflush (PF) porcine 300-600 Units    DISCONTINUED: diphenhydrAMINE injection 50 mg (BENADRYL)    DISCONTINUED: famotidine 20 mg/2 mL injection 20 mg (PEPCID)    DISCONTINUED: hydrocortisone sod succ (PF) injection 100 mg    DISCONTINUED: acetaminophen tablet 1,000 mg (TYLENOL)    DISCONTINUED: sodium chloride 0.9% 500 mL   3. Liver metastases (H)     4. Lesion of colon     5. Hypoalbuminemia  multivitamin (ONE A DAY) per tablet   6. Hypomagnesemia  magnesium sulfate in water 2 gram/50 mL (4 %) IVPB 2 g        CC: Austin Mendoza MD    ______________________________________________________________________________    History of Present Illness    Ms. Glenis Pop is here for follow-up with her daughter.    She says that overall she feels that she is doing well.  She has a small cold sore on her right upper lip.  That is healing.    She feels that she is tolerating the chemotherapy well so far.  After the chemotherapy she has some cold sensitivity in her fingertips and toes for a couple of days and then goes away.  No persistent numbness or tingling.    She says that she is eating well.  No abdominal pain.  Bowel movements are normal without any constipation or diarrhea.  She has not seen any blood in stool or black stools.    No fevers.  No night sweats.  No lumps or bumps anywhere.  No unusual headaches.  No eyesight problems.  No sores inside the mouth.  No difficulty with swallowing.  Nausea and vomiting is under very good control.  Breathing is fine.  No chest pain or cough.  No difficulty with urination.  No skin rashes.    Please see below.  A 14 point review of system is otherwise  completely negative.    Pain Status  Currently in Pain: No/denies    Review of Systems    Constitutional  Constitutional (WDL): Exceptions to WDL  Fatigue: Fatigue relieved by rest  Fever: None  Chills: None  Weight Gain: 5 - <10% from baseline(1#)  Weight Loss: None  Neurosensory  Neurosensory (WDL): Exceptions to WDL  Peripheral Motor Neuropathy: None  Ataxia: None  Peripheral Sensory Neuropathy: Asymptomatic, loss of deep tendon reflexes or paresthesia(cold sensitivity fingertips)  Confusion: None  Syncope: None  Cardiovascular  Cardiovascular (WDL): All cardiovascular elements are within defined limits(tachy)  Pulmonary  Respiratory (WDL): Within Defined Limits  Gastrointestinal  Gastrointestinal (WDL): Exceptions to WDL  Anorexia: None  Constipation: None  Diarrhea: None  Dysphagia: None  Esophagitis: None  Nausea: None  Pharyngitis: None  Vomiting: None  Dysgeusia: None  Dry Mouth: Symptomatic (e.g., dry or thick saliva) without significant dietary alteration, unstimulated saliva flow >0.2 ml/min  Genitourinary  Genitourinary (WDL): All genitourinary elements are within defined limits  Integumentary  Integumentary (WDL): Exceptions to WDL(fingernails)  Alopecia: Hair loss of up to 50% of normal for that individual that is not obvious from a distance but only on close inspection, a different hair style may be required to cover the hair loss but it does not require a wig or hair piece to camouflage  Rash Maculo-Papular: None  Pruritus: None  Urticaria: None  Palmar-Plantar Erythrodysesthesia Syndrome: Minimal skin changes or dermatitis (e.g., erythema, edema, or hyperkeratosis) without pain(hands)  Flushing: None  Patient Coping  Patient Coping: Open/discussion  Distress Assessment  Distress Assessment Score: 3  Accompanied by  Accompanied by: Family Member    Past History  Past Medical History:   Diagnosis Date     Endometriosis      Hemorrhoids      Pancreatic cancer (H)          Past Surgical History:    Procedure Laterality Date     IR PORT PLACEMENT >5 YEARS  9/3/2019    Right IJ port.     PERCUTANEOUS LIVER BIOPSY  08/15/2019     TOTAL ABDOMINAL HYSTERECTOMY W/ BILATERAL SALPINGOOPHORECTOMY  1980       Physical Exam    Recent Vitals 12/4/2019   Weight 141 lbs 10 oz   BSA (m2) 1.74 m2   /80   Pulse 107   Temp 98.4   Temp src 1   SpO2 96   Some recent data might be hidden       GENERAL: Alert and oriented to time place and person. Seated comfortably. In no distress.  She looks well overall.    HEAD: Atraumatic and normocephalic.    EYES: DAMON, EOMI.  No pallor.  No icterus.    Oral cavity: no mucosal lesion or tonsillar enlargement.  Small cold sore on her right upper lip.    NECK: supple. JVP normal.  No thyroid enlargement.    LYMPH NODES: No palpable, cervical, axillary or inguinal lymphadenopathy.    CHEST: clear to auscultation bilaterally.  Resonant to percussion throughout bilaterally.  Symmetrical breath movements bilaterally.    The Port-A-Cath of the right upper chest looks unremarkable.    CVS: S1 and S2 are heard. Regular rate and rhythm.  No murmur or gallop or rub heard.  No peripheral edema.    ABDOMEN: Soft. Not tender.  Mild abdominal distention appears improved.  No palpable hepatomegaly or splenomegaly.  No other mass palpable.  Bowel sounds heard.    EXTREMITIES: Warm.    SKIN: no rash, or bruising or purpura.  Has a full head of hair.            Lab Results    Recent Results (from the past 24 hour(s))   Magnesium    Collection Time: 12/04/19  9:12 AM   Result Value Ref Range    Magnesium 1.6 (L) 1.8 - 2.6 mg/dL   Comprehensive Metabolic Panel    Collection Time: 12/04/19  9:12 AM   Result Value Ref Range    Sodium 136 136 - 145 mmol/L    Potassium 3.4 (L) 3.5 - 5.0 mmol/L    Chloride 100 98 - 107 mmol/L    CO2 27 22 - 31 mmol/L    Anion Gap, Calculation 9 5 - 18 mmol/L    Glucose 190 (H) 70 - 125 mg/dL    BUN 14 8 - 22 mg/dL    Creatinine 0.64 0.60 - 1.10 mg/dL    GFR MDRD Af Amer >60  >60 mL/min/1.73m2    GFR MDRD Non Af Amer >60 >60 mL/min/1.73m2    Bilirubin, Total 0.5 0.0 - 1.0 mg/dL    Calcium 9.1 8.5 - 10.5 mg/dL    Protein, Total 6.4 6.0 - 8.0 g/dL    Albumin 2.8 (L) 3.5 - 5.0 g/dL    Alkaline Phosphatase 196 (H) 45 - 120 U/L    AST 21 0 - 40 U/L    ALT 10 0 - 45 U/L   HM1 (CBC with Diff)    Collection Time: 12/04/19  9:12 AM   Result Value Ref Range    WBC 7.2 4.0 - 11.0 thou/uL    RBC 3.23 (L) 3.80 - 5.40 mill/uL    Hemoglobin 10.2 (L) 12.0 - 16.0 g/dL    Hematocrit 31.5 (L) 35.0 - 47.0 %    MCV 98 80 - 100 fL    MCH 31.6 27.0 - 34.0 pg    MCHC 32.4 32.0 - 36.0 g/dL    RDW 19.8 (H) 11.0 - 14.5 %    Platelets 130 (L) 140 - 440 thou/uL    MPV 9.4 8.5 - 12.5 fL    Neutrophils % 74 (H) 50 - 70 %    Lymphocytes % 16 (L) 20 - 40 %    Monocytes % 9 2 - 10 %    Eosinophils % 1 0 - 6 %    Basophils % 1 0 - 2 %    Neutrophils Absolute 5.3 2.0 - 7.7 thou/uL    Lymphocytes Absolute 1.1 0.8 - 4.4 thou/uL    Monocytes Absolute 0.6 0.0 - 0.9 thou/uL    Eosinophils Absolute 0.1 0.0 - 0.4 thou/uL    Basophils Absolute 0.0 0.0 - 0.2 thou/uL             Imaging    Nm Pet Ct Skull To Mid Thigh    Result Date: 11/4/2019  EXAM: NM PET CT SKULL TO MID THIGH LOCATION: [facility] DATE/TIME: 11/4/2019 2:59 PM INDICATION: Subsequent treatment planning and restaging for malignant neoplasm of body of pancreas. Status post 4 cycles of chemotherapy FOLFIRINOX. Monitor treatment response. COMPARISON: FDG PET/CT dated 08/30/2019 and CT of the abdomen and pelvis dated 08/01/2019 TECHNIQUE: Serum glucose level 127 mg/dL. One hour post intravenous administration of 9.3 mCi F-18 FDG, PET imaging was performed from the skull base to the mid thighs utilizing attenuation correction with concurrent axial CT and PET/CT image fusion. Dose reduction techniques were used. FINDINGS: Interval decrease in size and metabolic activity of the ill-defined FDG avid mass in the body of the pancreas (max SUV 5.6, previously 8.6),  numerous liver lesions (Max SUV 4.9 a new lesion in the posterior right hepatic lobe, previously measured 5.8), and upper abdominal lymph nodes with mildly FDG avid persistent right hepatus lymph node (max SUV 3.5, previously 6.8). Redemonstrated probable bilateral pulmonary metastasis.  Diffuse FDG uptake within the axial and appendicular skeleton in a pattern typical of marrow stimulation. Persistent focal FDG uptake in the ascending colon/cecum, 20% of which may represent benign/malignant polyps (max SUV 9.9, previously 18.1). mucosal thickening in the right maxillary sinus. Moderate carotid artery bifurcation calcification. Moderate coronary artery calcification. Right chest port with tip terminating near the cavoatrial junction. Left adrenal adenoma. Sigmoid diverticula. Pelvic phleboliths. Hysterectomy. Mild scattered degenerative changes in the spine. Benign vertebral body hemangiomas T9 and T12.     1. Partial response to therapy with decreased size of the pancreatic body adenocarcinoma, liver metastasis, and upper abdominal lymph node metastasis. 2. Unchanged bilateral pulmonary metastasis. 3. Persistent focal FDG uptake in the ascending colon/cecum, 20% of which may represent benign/malignant polyps        Signed by: Ashia Kahn MD

## 2021-06-03 NOTE — PROGRESS NOTES
[facility] Hematology and Oncology Progress Note    Patient: Glenis Pop  MRN: [id number]  Date of Service: 11/06/2019        Reason for Visit:    D1C5 palliative FOLFIRINOX chemotherapy + Neulasta support.    Assessment and Plan:    Cancer Staging    1. 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     Presented with mild abdominal discomfort and backache for ~2 months.      08/01/19 CT CAP - numerous liver mets, a 35 mm mass in the body of the pancreas, upper abdominal lymph nodes that were enlarged and a left renal lesion that appears to be metastasis.      08/15/19 ultrasound-guided core needle biopsy from 1 of the liver mets - poorly differentiated adenocarcinoma consistent with a pancreatic primary.     08/27/19 CEA level was slightly elevated at 4.4.      08/27/19 CA-19-9 was grossly elevated at more than 22,000.      08/30/19 NM PET - mass in the pancreas is PET positive.  Numerous metastasis are seen in the liver, lungs and abdominal lymph nodes.  The left abdominal lesion is not lighting up so it appears to be an adenoma.  The unexpected finding is a PET positive nodule in the proximal ascending colon.  This would be an unusual site of metastasis for the pancreas cancer.  It could be an advanced colon polyp or even a separate colon cancer.    Opted to monitor PET positive nodule in the colon since she already has widely metastatic pancreas cancer.  Most of the chemotherapy is for pancreas cancer should be effective for colon cancer also in case this is a form of cancer.      Palliative FOLFIRINOX chemotherapy was recommended starting at 80% dosing.      09/03/19 IR port-a-cath placed.    10/08/19 - C1 palliative FOLFIRINOX chemotherapy at 80% dosing + Neulasta.    09/25/19 - C2 palliative FOLFIRINOX chemotherapy at 90% dosing + Neulasta.     10/09/19 - C3 palliative FOLFIRINOX chemotherapy at 100% dosing + Neulasta.     10/23/19 - C4 palliative FOLFIRINOX  chemotherapy at 100% dosing + Neulasta.     11/06/19:    11/04/19 NM PET - Partial response to therapy with decreased size of the pancreatic body adenocarcinoma, liver metastasis, and upper abdominal lymph node metastasis. Unchanged bilateral pulmonary metastasis. Persistent focal FDG uptake in the ascending colon/cecum, 20% of which may represent benign/malignant polyps.    CBC - HgB stable @ 11.2.  WBC and plts WNL.  Mild neutrophilia.    CMP - Alk Phos dropping.  Other LFTs WNL.  Improving hypoalbuminemia. Mildly elevated non-fasting BS.  Otherwise WNL.    Continue 3 Premier protein drinks/day each with 30 G protein.     Magnesium - WNL.    CA19.9 - pending (34,820, 10819, 74994, 07958 and 52613 on prior serial evaluations).    Glenis presents feeling quite good and in good spirits accompanied by her daughter.  She's been [id number]% dosing since C3 chemotherapy without incident.  No constipation or diarrhea.  No nausea - no antiemetics.  Appetite OK - 10 pound weight loss since star of treatment ... stable since C2.  Abdominal and back pain resolved - no recent Tylenol.  She lives alone but has family who will be staying with her as necessary.  No neuropathies.  Stable cold sensitivity to frozen food.  Very pleased with news of NM PET showing response to treatment.    NM PET shows response to treatment.  Biochemical marker coming down at last check.    I reviewed potential/likely side effects palliative FOLFIRINOX chemotherapy.    Proceed D1C5 palliative FOLFIRINOX chemotherapy @ 100% dosing + Neulasta.     I encouraged her to be proactive in treating any nausea.    She was instructed that if she would note a fever > 100.4F the Cancer clinic needs to be called immediately day or night as inpatient admission and antibiotics may be needed.    Appointment scheduled the week before Thanksgiving with genetic counselor.  Purpose is twofold;     If she has a BRCA mutation we can consider treatment with a pop inhibitor  if she has a good response to the initial chemotherapy.    If she is found to have a germline mutation that is pathogenic it would be helpful for her family since they can take measures to detect the cancer earlier or prevent it.      11/20/19 - follow up D1C6 palliative FOLFIRINOX chemotherapy + Neulasta.     CA-19-9 level with each cycle.    2nd line therapy with Gemcitabine and Abraxane chemotherapy.      ECOG Performance:     ECOG Performance Status: 1    Distress Assessment:    Distress Assessment Score: 4(results day)    Pain:    Pain Score (Initial OR Reassessment): No/Denies Pain        TT > 25 minutes face to face with > 50 % in counseling and coordination of care.    Leonidas Adams, CNP      CC: Austin Mendoza MD  __________________________________    Interim History:    Ms. Glenis Pop presents anticipating C5 palliative chemotherapy accompanied by her daughter.  She looks and feels quite good.  She's been tolerating 100% dosing since C3 chemotherapy without incident.  She states we could even go higher.  No constipation or diarrhea.  No nausea - no antiemetics.  Appetite OK - 10 pound weight loss since start of treatment but stable since C2.  Abdominal and back pain resolved - no recent Tylenol.  Stable cold sensitivity to frozen food.  No other neuropathies.  She denies cough, fever, chills, unusual headaches, visual or mentation disturbance, bladder issues, rash.  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  She lives alone but has family who will be staying with her as necessary.      Pain Status:    Currently in Pain: No/denies    Review of Systems    Constitutional  Constitutional (WDL): Exceptions to WDL  Fatigue: Fatigue relieved by rest(after tx for a few days only)  Weight Loss: None  Neurosensory  Neurosensory (WDL): Exceptions to WDL  Peripheral Motor Neuropathy: None  Peripheral Sensory Neuropathy: Asymptomatic, loss  of deep tendon reflexes or paresthesia(gloves for cold stuff)  Cardiovascular  Cardiovascular (WDL): All cardiovascular elements are within defined limits  Pulmonary  Respiratory (WDL): Within Defined Limits  Gastrointestinal  Gastrointestinal (WDL): All gastrointestinal elements are within defined limits  Dry Mouth: Symptomatic (e.g., dry or thick saliva) without significant dietary alteration, unstimulated saliva flow >0.2 ml/min  Genitourinary  Genitourinary (WDL): All genitourinary elements are within defined limits  Integumentary  Integumentary (WDL): Exceptions to WDL(nails purplish but dont bother me)  Patient Coping  Patient Coping: Accepting;Open/discussion  Distress Assessment  Distress Assessment Score: 4(results day)  Accompanied by  Accompanied by: Family Member    Past History:    Past Medical History:   Diagnosis Date     Endometriosis      Hemorrhoids      Pancreatic cancer (H)          Past Surgical History:   Procedure Laterality Date     IR PORT PLACEMENT >5 YEARS  9/3/2019    Right IJ port.     PERCUTANEOUS LIVER BIOPSY  08/15/2019     TOTAL ABDOMINAL HYSTERECTOMY W/ BILATERAL SALPINGOOPHORECTOMY  1980       Physical Exam:    Recent Vitals 11/6/2019   Height -   Weight 142 lbs 8 oz   BSA (m2) 1.75 m2   /80   Pulse 111   Temp 98   Temp src 1   SpO2 98   Some recent data might be hidden       GENERAL:   Pleasant.  Alert and oriented.  Comfortable.  In no acute distress.  Daughter accompanies.    HEENT:   Atraumatic and normocephalic. GAYATHRI.  EOMI.  No pallor.  No icterus.  No mucosal lesions.    LYMPH NODES:  No palpable cervical, axillary or inguinal lymphadenopathy.    CHEST:   Lungs clear to auscultation bilaterally.    Port-A-Cath over the right upper chest looks unremarkable.    CVS:    S1 and S2 heard.  Regular rate and rhythm.  No murmur, gallop or rub heard.  No peripheral edema.    ABDOMEN:   Soft.  Not tender.  Mildly distended - palpable hepatomegaly in the epigastrium.  Bowel  sounds heard.    EXTREMITIES:  Warm.  No SAM.    SKIN:    No rash, bruising or purpura noted.  Thinning, but full head of hair.      Lab Results:    Reviewed with patient.    Recent Results (from the past 24 hour(s))   Magnesium   Result Value Ref Range    Magnesium 1.8 1.8 - 2.6 mg/dL   Comprehensive Metabolic Panel   Result Value Ref Range    Sodium 136 136 - 145 mmol/L    Potassium 3.8 3.5 - 5.0 mmol/L    Chloride 102 98 - 107 mmol/L    CO2 24 22 - 31 mmol/L    Anion Gap, Calculation 10 5 - 18 mmol/L    Glucose 173 (H) 70 - 125 mg/dL    BUN 12 8 - 22 mg/dL    Creatinine 0.68 0.60 - 1.10 mg/dL    GFR MDRD Af Amer >60 >60 mL/min/1.73m2    GFR MDRD Non Af Amer >60 >60 mL/min/1.73m2    Bilirubin, Total 0.5 0.0 - 1.0 mg/dL    Calcium 9.5 8.5 - 10.5 mg/dL    Protein, Total 6.8 6.0 - 8.0 g/dL    Albumin 3.0 (L) 3.5 - 5.0 g/dL    Alkaline Phosphatase 298 (H) 45 - 120 U/L    AST 27 0 - 40 U/L    ALT 18 0 - 45 U/L   HM1 (CBC with Diff)   Result Value Ref Range    WBC 10.7 4.0 - 11.0 thou/uL    RBC 3.65 (L) 3.80 - 5.40 mill/uL    Hemoglobin 11.2 (L) 12.0 - 16.0 g/dL    Hematocrit 34.5 (L) 35.0 - 47.0 %    MCV 95 80 - 100 fL    MCH 30.7 27.0 - 34.0 pg    MCHC 32.5 32.0 - 36.0 g/dL    RDW 18.9 (H) 11.0 - 14.5 %    Platelets 166 140 - 440 thou/uL    MPV 9.1 8.5 - 12.5 fL    Neutrophils % 75 (H) 50 - 70 %    Lymphocytes % 13 (L) 20 - 40 %    Monocytes % 8 2 - 10 %    Eosinophils % 1 0 - 6 %    Basophils % 1 0 - 2 %    Neutrophils Absolute 8.0 (H) 2.0 - 7.7 thou/uL    Lymphocytes Absolute 1.4 0.8 - 4.4 thou/uL    Monocytes Absolute 0.9 0.0 - 0.9 thou/uL    Eosinophils Absolute 0.1 0.0 - 0.4 thou/uL    Basophils Absolute 0.1 0.0 - 0.2 thou/uL       Imaging:    Reviewed with patient.    Nm Pet Ct Skull To Mid Thigh    Result Date: 11/4/2019  EXAM: NM PET CT SKULL TO MID THIGH LOCATION: [facility] DATE/TIME: 11/4/2019 2:59 PM INDICATION: Subsequent treatment planning and restaging for malignant neoplasm of body of  pancreas. Status post 4 cycles of chemotherapy FOLFIRINOX. Monitor treatment response. COMPARISON: FDG PET/CT dated 08/30/2019 and CT of the abdomen and pelvis dated 08/01/2019 TECHNIQUE: Serum glucose level 127 mg/dL. One hour post intravenous administration of 9.3 mCi F-18 FDG, PET imaging was performed from the skull base to the mid thighs utilizing attenuation correction with concurrent axial CT and PET/CT image fusion. Dose reduction techniques were used. FINDINGS: Interval decrease in size and metabolic activity of the ill-defined FDG avid mass in the body of the pancreas (max SUV 5.6, previously 8.6), numerous liver lesions (Max SUV 4.9 a new lesion in the posterior right hepatic lobe, previously measured 5.8), and upper abdominal lymph nodes with mildly FDG avid persistent right hepatus lymph node (max SUV 3.5, previously 6.8). Redemonstrated probable bilateral pulmonary metastasis.  Diffuse FDG uptake within the axial and appendicular skeleton in a pattern typical of marrow stimulation. Persistent focal FDG uptake in the ascending colon/cecum, 20% of which may represent benign/malignant polyps (max SUV 9.9, previously 18.1). mucosal thickening in the right maxillary sinus. Moderate carotid artery bifurcation calcification. Moderate coronary artery calcification. Right chest port with tip terminating near the cavoatrial junction. Left adrenal adenoma. Sigmoid diverticula. Pelvic phleboliths. Hysterectomy. Mild scattered degenerative changes in the spine. Benign vertebral body hemangiomas T9 and T12.     1. Partial response to therapy with decreased size of the pancreatic body adenocarcinoma, liver metastasis, and upper abdominal lymph node metastasis. 2. Unchanged bilateral pulmonary metastasis. 3. Persistent focal FDG uptake in the ascending colon/cecum, 20% of which may represent benign/malignant polyps

## 2021-06-04 VITALS
OXYGEN SATURATION: 97 % | DIASTOLIC BLOOD PRESSURE: 72 MMHG | HEART RATE: 109 BPM | SYSTOLIC BLOOD PRESSURE: 147 MMHG | WEIGHT: 136.6 LBS | TEMPERATURE: 98.2 F | BODY MASS INDEX: 21.39 KG/M2

## 2021-06-04 VITALS
HEART RATE: 98 BPM | DIASTOLIC BLOOD PRESSURE: 80 MMHG | TEMPERATURE: 98 F | BODY MASS INDEX: 21.75 KG/M2 | WEIGHT: 138.9 LBS | OXYGEN SATURATION: 96 % | SYSTOLIC BLOOD PRESSURE: 122 MMHG

## 2021-06-04 VITALS
DIASTOLIC BLOOD PRESSURE: 72 MMHG | HEART RATE: 109 BPM | WEIGHT: 140.1 LBS | BODY MASS INDEX: 21.94 KG/M2 | TEMPERATURE: 98.3 F | SYSTOLIC BLOOD PRESSURE: 136 MMHG | OXYGEN SATURATION: 96 %

## 2021-06-04 VITALS
BODY MASS INDEX: 21.18 KG/M2 | DIASTOLIC BLOOD PRESSURE: 67 MMHG | TEMPERATURE: 98.2 F | SYSTOLIC BLOOD PRESSURE: 116 MMHG | WEIGHT: 135.2 LBS | OXYGEN SATURATION: 97 % | HEART RATE: 99 BPM

## 2021-06-04 VITALS
SYSTOLIC BLOOD PRESSURE: 136 MMHG | BODY MASS INDEX: 22.18 KG/M2 | OXYGEN SATURATION: 96 % | TEMPERATURE: 98.4 F | DIASTOLIC BLOOD PRESSURE: 80 MMHG | WEIGHT: 141.6 LBS | HEART RATE: 107 BPM

## 2021-06-04 VITALS — WEIGHT: 139 LBS | BODY MASS INDEX: 21.82 KG/M2 | HEIGHT: 67 IN

## 2021-06-04 VITALS
WEIGHT: 135.1 LBS | OXYGEN SATURATION: 97 % | SYSTOLIC BLOOD PRESSURE: 126 MMHG | DIASTOLIC BLOOD PRESSURE: 71 MMHG | TEMPERATURE: 98 F | HEART RATE: 97 BPM | BODY MASS INDEX: 21.16 KG/M2

## 2021-06-04 VITALS
BODY MASS INDEX: 21.18 KG/M2 | OXYGEN SATURATION: 97 % | TEMPERATURE: 98.2 F | DIASTOLIC BLOOD PRESSURE: 75 MMHG | SYSTOLIC BLOOD PRESSURE: 135 MMHG | WEIGHT: 135.2 LBS | HEART RATE: 108 BPM

## 2021-06-04 VITALS
SYSTOLIC BLOOD PRESSURE: 136 MMHG | HEART RATE: 76 BPM | BODY MASS INDEX: 21.85 KG/M2 | OXYGEN SATURATION: 99 % | WEIGHT: 139.5 LBS | DIASTOLIC BLOOD PRESSURE: 72 MMHG | TEMPERATURE: 98.2 F

## 2021-06-04 VITALS — HEIGHT: 67 IN | WEIGHT: 137 LBS | BODY MASS INDEX: 21.5 KG/M2

## 2021-06-04 NOTE — PROGRESS NOTES
Pt amb into clinic with daughter, Mona, for chemo administration. Reviewed plan of care. Pt received IV mag sulf without incident. Pt premedicated. Pt tolerated infusions well. Report given to Eli ABURTO

## 2021-06-04 NOTE — PROGRESS NOTES
[facility] Hematology and Oncology Progress Note    Patient: Glenis Pop  MRN: [id number]  Date of Service: 01/03/2020        Reason for Visit    Follow-up regarding metastatic pancreas cancer.    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    Pain   : No pain now.    Ms. Glenis Pop is a 69 y.o. woman who presents with some complaints of mild abdominal discomfort and backache over 2 months.  CT scan of the abdomen and pelvis that was done on 8/1/2019 showed numerous liver mets, a 35 mm mass in the body of the pancreas, upper abdominal lymph nodes that were enlarged and a left renal lesion that appears to be metastasis.  She had an ultrasound-guided core needle biopsy from 1 of the liver mets on 8/15/2019 which showed a poorly differentiated adenocarcinoma consistent with a pancreatic primary.   The CEA level was slightly elevated at 4.4.  The CA-19-9 was grossly elevated at more than 22,000.  I reviewed the results with her.  The PET CT scan done on 8/30/2019showed the mass in the pancreas that is PET positive.  Numerous metastases are seen in the liver, lungs and abdominal lymph nodes.  The left abdominal lesion is not lighting up so it appears to be an adenoma.  The unexpected finding is a PET positive nodule in the proximal ascending colon.This could be an unusual site of metastasis for the pancreas cancer.  This could also turn out to be an advanced colon polyp or even a separate colon cancer.  We decided to watch it in the subsequent imaging studies.    He has had a genetic counseling and genetic testing and we did not find any germline mutation that is suspicious like  BRCA 1 or 2.  We did request for Foundation one mutation testing on the liver biopsy sample but we were informed that there was insufficient tissue for doing the test.    She started chemotherapy with FOLFIRINOX on 9/11/2019.  The first cycle was given at 80% dose and with Neulasta support.  The second cycle was given at the 90% dose and we could go to 100% dose with cycle #3.    The PET CT scan that was done on 11/4/2019, after cycle #4 of chemotherapy, showed a partial response with decreased size of the pancreatic body adenocarcinoma, liver mets and upper abdominal lymph nodes.  The lung nodules appeared about the same.  There is still persistent uptake in the ascending colon.    1.  She has now completed 8 cycles of FOLFIRINOX chemotherapy.  Overall I think she has tolerated chemotherapy fairly well.  She states that her energy is fairly good.  She is no longer complaining of the abdominal distention or abdominal pain.  CA-19-9 level has been progressively decreasing with each cycle of chemotherapy.    She has had a new PET CT scan done on 12/31/2019.  I reviewed the images and the report with the patient and her daughter.  In the PET CT scan the pancreatic mass and the liver mets look about the same in size and PET activity.  The multiple tiny lung nodules also look stable.  Overall the appearance and the PET CT scan is that of stable disease.  I discussed with the patient that even though the PET CT scan shows only stable disease, given the progressive decrease in the CA-19-9 level and how well she feels, I think she is still responding to chemotherapy.  She voiced understanding.  We will plan on continuing with FOLFIRINOX chemotherapy.  We will plan on repeating a PET CT scan after 4 more cycles (after cycle #12).  I discussed with her that if eventually the cancer stops responding to FOLFIRINOX, we can try a different chemotherapy regimen.  She voiced understanding.    2.  Labs from today were reviewed.   Metabolic panel is overall stable.  When we look at her blood counts, they are similar to what she has had.  Hemoglobin is 9.8 slightly lower than earlier.  WBC count is 8.3 with an adequate ANC.  Platelet count is slightly below normal at 125,000.  However counts are good enough to proceed with chemotherapy.  We will proceed with cycle #9 of chemotherapy at 100% dose.  She will be receiving Neulasta tomorrow.    3.  Her potassium today is 3.2.  Improved from a potassium of 3 in the last visit after she started taking the potassium chloride tablets.  I discussed with her that unfortunately she has to continue with the potassium.  Advised her to take the potassium chloride tablets with food to reduce the stomach upset.  I am increasing the dose to 20 mEq p.o. once daily.  A new prescription was sent to the pharmacy.  She will also receive 20 mEq of potassium chloride liquid in the infusion room orally today.    4.  I again pointed out to her that the colonic lesion is still lighting up in the PET CT scan.  This could be a polyp or it could even be a second colon cancer or it could be a metastasis from the pancreas cancer.  I think we will need to proceed to a colonoscopy only if she starts having any symptoms from it.  She was in agreement with the plan.    5.  Follow-up: Return to clinic with MD or NP in 2 weeks with labs according to treatment plan and appointment for next cycle of chemotherapy.  I have requested for her scheduling to be adjusted for her to come in for chemotherapy on Wednesdays.    Time spend >25 minutes face to face with the patient. More than 50 % in counseling and coordination of care.        Problem List    1. Malignant neoplasm of body of pancreas (H)  CC OFFICE VISIT LONG    potassium chloride 20 mEq TbER    NURSE VISIT    CC OFFICE VISIT LONG    Infusion Appointment   2. Encounter for antineoplastic chemotherapy  CC OFFICE VISIT LONG    NURSE VISIT    CC OFFICE VISIT LONG    Infusion  Appointment   3. Liver metastases (H)     4. Lesion of colon     5. Hypokalemia  potassium chloride 20 mEq TbER    potassium chloride packet 20 mEq (KLOR-CON)        CC: Austin Mendoza MD    ______________________________________________________________________________    History of Present Illness    Ms. Glenis Pop is here for follow-up with her daughter.    She says that overall she feels that she is doing well.  Energy is fairly good.  She says that she is able to do most of things that she wants to do.  When she feels tired she just takes some rest.    She states that she does not have any pain anywhere.    Today her only complaint is that the potassium tablet causes her to have some stomach churning and gas.  She wonders whether she really needs to continue with that.    She would also like to go back to being treated on Wednesdays with chemotherapy.  That is much more convenient for her and her daughter who has to work.    No fevers.  No night sweats.  No lumps or bumps anywhere.  No unusual headaches.  No eyesight problems.  No mouth sores.  No swallowing difficulty.  No complaints of nausea or vomiting.  Breathing is fine.  No chest pain or cough.  No abdominal pain.  No complaints of constipation or diarrhea now.  She states that she has been paying particular attention and has not noticed any blood in stool or black stools.  No difficulty with urination.  No skin rashes.  No numbness or tingling.    Please see below.  A 14 point review of system is otherwise completely negative.      Pain Status  Currently in Pain: No/denies    Review of Systems    Constitutional  Constitutional (WDL): Exceptions to WDL  Fatigue: Fatigue relieved by rest  Fever: None  Chills: None  Weight Gain: None  Weight Loss: None  Neurosensory  Neurosensory (WDL): Exceptions to WDL  Peripheral Motor Neuropathy: None  Ataxia: None  Peripheral Sensory Neuropathy: Asymptomatic, loss of deep tendon reflexes or  paresthesia(cold sensitivity after tx, resolves)  Confusion: None  Syncope: None  Cardiovascular  Cardiovascular (WDL): All cardiovascular elements are within defined limits  Pulmonary  Respiratory (WDL): Exceptions to WDL  Cough: Mild symptoms, nonprescription intervention indicated(just AM phlegm)  Dyspnea: None  Hypoxia: None  Gastrointestinal  Gastrointestinal (WDL): Exceptions to WDL(excess gas, churning, ? K+)  Anorexia: None  Constipation: None  Diarrhea: None  Esophagitis: None  Nausea: None  Pharyngitis: None  Vomiting: None  Dysgeusia: Altered taste but no change in diet  Dry Mouth: Symptomatic (e.g., dry or thick saliva) without significant dietary alteration, unstimulated saliva flow >0.2 ml/min  Genitourinary  Genitourinary (WDL): All genitourinary elements are within defined limits  Integumentary  Integumentary (WDL): Exceptions to WDL  Alopecia: Hair loss of up to 50% of normal for that individual that is not obvious from a distance but only on close inspection, a different hair style may be required to cover the hair loss but it does not require a wig or hair piece to camouflage  Rash Maculo-Papular: None  Pruritus: None  Urticaria: None  Palmar-Plantar Erythrodysesthesia Syndrome: Minimal skin changes or dermatitis (e.g., erythema, edema, or hyperkeratosis) without pain(hands)  Flushing: None  Patient Coping     Distress Assessment  Distress Assessment Score: 3  Accompanied by  Accompanied by: Family Member    Past History  Past Medical History:   Diagnosis Date     Endometriosis      Hemorrhoids      Pancreatic cancer (H)          Past Surgical History:   Procedure Laterality Date     IR PORT PLACEMENT >5 YEARS  9/3/2019    Right IJ port.     PERCUTANEOUS LIVER BIOPSY  08/15/2019     TOTAL ABDOMINAL HYSTERECTOMY W/ BILATERAL SALPINGOOPHORECTOMY  1980       Physical Exam    Recent Vitals 1/3/2020   Height -   Weight 138 lbs 14 oz   BSA (m2) 1.73 m2   /80   Pulse 98   Temp 98   Temp src 1    SpO2 96   Some recent data might be hidden       GENERAL: Alert and oriented to time place and person. Seated comfortably. In no distress.  She looks well overall.  Normal build.  Elderly.    HEAD: Atraumatic and normocephalic.    EYES: DAMON, EOMI.  No pallor.  No icterus.    Oral cavity: no mucosal lesion or tonsillar enlargement.    NECK: supple. JVP normal.  No thyroid enlargement.    LYMPH NODES: No palpable, cervical, axillary or inguinal lymphadenopathy.    CHEST: clear to auscultation bilaterally.  Resonant to percussion throughout bilaterally.  Symmetrical breath movements bilaterally.    The Port-A-Cath over the right chest looks unremarkable.    CVS: S1 and S2 are heard. Regular rate and rhythm.  No murmur or gallop or rub heard.  No peripheral edema.    ABDOMEN: Soft. Not tender.  Slightly distended.  No palpable hepatomegaly or splenomegaly.  No other mass palpable.  Bowel sounds heard.    EXTREMITIES: Warm.    SKIN: no rash, or bruising or purpura.  Has a full head of hair.            Lab Results    Recent Results (from the past 24 hour(s))   Magnesium    Collection Time: 01/03/20  9:37 AM   Result Value Ref Range    Magnesium 1.5 (L) 1.8 - 2.6 mg/dL   Comprehensive Metabolic Panel    Collection Time: 01/03/20  9:37 AM   Result Value Ref Range    Sodium 139 136 - 145 mmol/L    Potassium 3.2 (L) 3.5 - 5.0 mmol/L    Chloride 104 98 - 107 mmol/L    CO2 24 22 - 31 mmol/L    Anion Gap, Calculation 11 5 - 18 mmol/L    Glucose 165 (H) 70 - 125 mg/dL    BUN 13 8 - 22 mg/dL    Creatinine 0.57 (L) 0.60 - 1.10 mg/dL    GFR MDRD Af Amer >60 >60 mL/min/1.73m2    GFR MDRD Non Af Amer >60 >60 mL/min/1.73m2    Bilirubin, Total 0.5 0.0 - 1.0 mg/dL    Calcium 9.2 8.5 - 10.5 mg/dL    Protein, Total 6.3 6.0 - 8.0 g/dL    Albumin 3.0 (L) 3.5 - 5.0 g/dL    Alkaline Phosphatase 189 (H) 45 - 120 U/L    AST 24 0 - 40 U/L    ALT 12 0 - 45 U/L   HM1 (CBC with Diff)    Collection Time: 01/03/20  9:37 AM   Result Value Ref Range     WBC 8.3 4.0 - 11.0 thou/uL    RBC 2.96 (L) 3.80 - 5.40 mill/uL    Hemoglobin 9.8 (L) 12.0 - 16.0 g/dL    Hematocrit 30.6 (L) 35.0 - 47.0 %     (H) 80 - 100 fL    MCH 33.1 27.0 - 34.0 pg    MCHC 32.0 32.0 - 36.0 g/dL    RDW 19.6 (H) 11.0 - 14.5 %    Platelets 125 (L) 140 - 440 thou/uL    MPV 9.8 8.5 - 12.5 fL    Neutrophils % 77 (H) 50 - 70 %    Lymphocytes % 12 (L) 20 - 40 %    Monocytes % 9 2 - 10 %    Eosinophils % 1 0 - 6 %    Basophils % 0 0 - 2 %    Neutrophils Absolute 6.4 2.0 - 7.7 thou/uL    Lymphocytes Absolute 1.0 0.8 - 4.4 thou/uL    Monocytes Absolute 0.8 0.0 - 0.9 thou/uL    Eosinophils Absolute 0.1 0.0 - 0.4 thou/uL    Basophils Absolute 0.0 0.0 - 0.2 thou/uL             Imaging    Nm Pet Ct Skull To Mid Thigh    Result Date: 12/31/2019  EXAM: NM PET CT SKULL TO MID THIGH LOCATION: [facility] DATE/TIME: 12/31/2019 10:08 AM INDICATION: Subsequent treatment planning and restaging for malignant neoplasm of body of pancreas. Status post chemotherapy. Monitor treatment response. COMPARISON: [Thank you TECHNIQUE: Serum glucose level 121 mg/dL. One hour post intravenous administration of 7.7 mCi F-18 FDG, PET imaging was performed from the skull base to the mid thighs utilizing attenuation correction with concurrent axial CT and PET/CT image fusion. Dose reduction techniques were used. FINDINGS: Redemonstrated FDG avid mass in the body of the pancreas (max SUV 6.0, previously 5.6), numerous lesions throughout the liver many of which have slightly increased in metabolic activity (Max 6.5 in the posterior right hepatic lobe, previously 4.9), a sheldon hepatis lymph node (max SUV 4.5, previously 3.5), and mildly FDG avid bilateral pulmonary nodules suspicious for metastasis. Diffuse FDG uptake within the axial and appendicular skeleton in a pattern typical of marrow stimulation. Persistent focal FDG uptake in the ascending colon/cecum, 20% of which may represent benign/malignant polyps (max SUV  7.9, previously 9.9). Mild senescent intracranial changes. Mild to moderate carotid artery bifurcation calcifications. Right chest port with tip terminating in the upper right atrium. Mild coronary artery calcium. New perihepatic ascites. Cholelithiasis. Small fat-containing umbilical hernia. Sigmoid diverticulosis. Hysterectomy. Pelvic phleboliths. Benign T9 and T12 vertebral body hemangiomas. Multilevel degenerative changes of spine.     1. Broadly stable disease with persistent pancreatic adenocarcinoma with liver, sheldon hepatis lymph node, and pulmonary metastasis. 2. Unchanged focal FDG uptake in the ascending colon/cecum, 20% of which may represent benign/malignant polyps.        Signed by: Ashia Kahn MD

## 2021-06-04 NOTE — PROGRESS NOTES
[facility] Hematology and Oncology Progress Note    Patient: Glenis Pop  MRN: [id number]  Date of Service: 12/18/2019        Reason for Visit:    D1C8 palliative FOLFIRINOX chemotherapy + Neulasta support.    Assessment and Plan:    Cancer Staging    1. 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     Presented with mild abdominal discomfort and backache for ~2 months.      08/01/19 CT CAP - numerous liver mets, a 35 mm mass in the body of the pancreas, upper abdominal lymph nodes that were enlarged and a left renal lesion that appears to be metastasis.      08/15/19 ultrasound-guided core needle biopsy from 1 of the liver mets - poorly differentiated adenocarcinoma consistent with a pancreatic primary.     08/27/19 CEA level was slightly elevated at 4.4.      08/27/19 CA-19-9 was grossly elevated at more than 22,000.      08/30/19 NM PET - mass in the pancreas is PET positive.  Numerous metastasis are seen in the liver, lungs and abdominal lymph nodes.  The left abdominal lesion is not lighting up so it appears to be an adenoma.  The unexpected finding is a PET positive nodule in the proximal ascending colon.  This would be an unusual site of metastasis for the pancreas cancer.  It could be an advanced colon polyp or even a separate colon cancer.    Opted to monitor PET positive nodule in the colon since she already has widely metastatic pancreas cancer.  Most of the chemotherapy is for pancreas cancer should be effective for colon cancer also in case this is a form of cancer.      Palliative FOLFIRINOX chemotherapy was recommended starting at 80% dosing.      09/03/19 IR port-a-cath placed.    09/11/19 - C1 palliative FOLFIRINOX chemotherapy at 80% dosing + Neulasta.    09/25/19 - C2 palliative FOLFIRINOX chemotherapy at 90% dosing + Neulasta.     10/09/19 - C3 palliative FOLFIRINOX chemotherapy at 100% dosing + Neulasta.     11/04/19 NM PET - Partial response to  "therapy with decreased size of the pancreatic body adenocarcinoma, liver metastasis, and upper abdominal lymph node metastasis. Unchanged bilateral pulmonary metastasis. Persistent focal FDG uptake in the ascending colon/cecum, 20% of which may represent benign/malignant polyps.     11/21/19 genetic consult - obtained [employer] genetic testing (37 genes) through [facility].  STAT request for BRCAPlus genes (including BRCA1 and BRCA2).    If she has a BRCA mutation we can consider treatment with a pop inhibitor if she has a good response to the initial chemotherapy.    If she is found to have a germline mutation that is pathogenic it would be helpful for her family since they can take measures to detect the cancer earlier or prevent it.      12/18/19:    CBC - HgB stable @ 10.3.  WBC and ANC WNL.  Plts 121K.    CMP - Alk Phos dropping.  Other LFTs WNL.  Improved albuminemia. Mildly elevated non-fasting BS.  Worsening hypokalemia.  Otherwise WNL.    Continue 3 Premier protein drinks/day each with 30 G protein.   Also eating high protein foods.    Reviewed potassium rich foods.  Begin 10 meq KDur twice daily x 5 days then decrease to once daily.  Rx Eprescribed to her pharmacy.    Magnesium - 1.6.    Will give 4 grams IVPB today.    CA19.9 - pending (21562, 49797, 51377, 46292, 47767, 40944, 99170 and 83108 on prior serial evaluations).    Glenis presents looking and feeling quite good and in good spirits accompanied by her daughter.  She again states \"I haven't felt this good in a long time\".  She's been tolerating 100% FOLFIRINOX chemotherapy dosing since C3 without incident.  No constipation or diarrhea.  No nausea - no antiemetics.  Appetite good, weight now quite stable after ~10 pound weight loss from start of treatment ... stable since C2.  Abdominal and back pain resolved - no recent Tylenol.  She lives alone but has family who will be staying with her as necessary.  No neuropathies.  Stable cold " hypersensitivity to frozen food for 3-4 days post each treatment, then mostly resolves.      I again reviewed potential/likely side effects palliative FOLFIRINOX chemotherapy.    Proceed D1C8 palliative FOLFIRINOX chemotherapy @ 100% dosing + Neulasta.     I encouraged her to be proactive in treating any nausea.    She was instructed that if she would note a fever > 100.4F the Cancer clinic needs to be called immediately day or night as inpatient admission and antibiotics may be needed.    01/02/20 - follow up D1C9 palliative FOLFIRINOX chemotherapy + Neulasta with NM PET and labs per Treatment Plan.     CA-19-9 level with each cycle.    2nd line therapy with Gemcitabine and Abraxane chemotherapy.      She has no symptoms from the PET positive cecal lesion.  This could be a pancreatic cancer metastasis or it could be a second cancer.  We will follow this clinically and if she has any symptoms plan for colonoscopy.    ECOG Performance:     ECOG Performance Status: 1    Distress Assessment:    Distress Assessment Score: 3    Pain:    Pain Score (Initial OR Reassessment): No/Denies Pain        TT > 25 minutes face to face with > 50 % in counseling and coordination of care.    Leonidas Adams, CNP      CC: Austin Mendoza MD  __________________________________    Interim History:    Ms. Glenis Pop presents anticipating C8 palliative chemotherapy accompanied by her daughter.  She looks and feels quite good.  She's been tolerating 100% FOLFIRINOX chemotherapy dosing since C3 without incident.  She states we could even go higher.  No constipation or diarrhea.  No nausea - no antiemetics.  Appetite good and weight now stable after ~10 pound weight loss since start of treatment ... stable since C2.  Abdominal and back pain resolved - no recent Tylenol.  Stable cold sensitivity to frozen food.  No other neuropathies.  She denies cough, fever, chills, unusual headaches, visual or mentation disturbance,  bladder issues, rash.      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  She lives alone but has family who will be staying with her as necessary.      Pain Status:    Currently in Pain: No/denies    Review of Systems:    Constitutional  Constitutional (WDL): Exceptions to WDL  Fatigue: Fatigue relieved by rest  Weight Loss: to <10% from baseline, intervention not indicated  Neurosensory  Neurosensory (WDL): Exceptions to WDL  Peripheral Sensory Neuropathy: Asymptomatic, loss of deep tendon reflexes or paresthesia(cold sensitivity after chemo which goes away)  Eye   Eye Disorder (WDL): All eye disorder elements are within defined limits  Ear  Ear Disorder (WDL): All ear disorder elements are within defined limits  Cardiovascular  Cardiovascular (WDL): All cardiovascular elements are within defined limits  Pulmonary  Respiratory (WDL): Exceptions to WDL  Cough: Mild symptoms, nonprescription intervention indicated(just in the morning i bring up clear phlegm)  Gastrointestinal  Gastrointestinal (WDL): All gastrointestinal elements are within defined limits  Dry Mouth: Symptomatic (e.g., dry or thick saliva) without significant dietary alteration, unstimulated saliva flow >0.2 ml/min  Genitourinary  Genitourinary (WDL): All genitourinary elements are within defined limits  Lymphatic  Lymph (WDL): All lymph disorder elements are within defined limits  Musculoskeletal and Connective Tissue  Musculoskeletal and Connetive Tissue Disorders (WDL): Exceptions to WDL  Muscle Weakness : Symptomatic, perceived by patient but not evident on physical exam(a little weaker legs)  Integumentary  Integumentary (WDL): Exceptions to WDL(fingernails dark)  Alopecia: Hair loss of up to 50% of normal for that individual that is not obvious from a distance but only on close inspection, a different hair style may be required to cover the hair loss but it does not require a wig or hair  piece to camouflage  Patient Coping  Patient Coping: Open/discussion  Distress Assessment  Distress Assessment Score: 3  Accompanied by  Accompanied by: Alone  Oral Chemo Adherence       Past History:    Past Medical History:   Diagnosis Date     Endometriosis      Hemorrhoids      Pancreatic cancer (H)          Past Surgical History:   Procedure Laterality Date     IR PORT PLACEMENT >5 YEARS  9/3/2019    Right IJ port.     PERCUTANEOUS LIVER BIOPSY  08/15/2019     TOTAL ABDOMINAL HYSTERECTOMY W/ BILATERAL SALPINGOOPHORECTOMY  1980       Physical Exam:    Recent Vitals 12/18/2019   Weight 139 lbs 8 oz   BSA (m2) 1.73 m2   /72   Pulse 76   Temp 98.2   Temp src 1   SpO2 99   Some recent data might be hidden       GENERAL:   Pleasant.  Alert and oriented.  Comfortable.  In no acute distress.  Daughter accompanies.    HEENT:   Atraumatic and normocephalic. GAYATHRI.  EOMI.  No pallor.  No icterus.  No mucosal lesions.    LYMPH NODES:  No cervical, axillary or inguinal lymphadenopathy palpable.    CHEST:   Lungs clear to auscultation bilaterally.    Port-A-Cath over the right upper chest looks unremarkable.    CVS:    S1 and S2 heard.  RRR.  No murmur, gallop or rub heard.  No peripheral edema.    ABDOMEN:   Soft.  Not tender.  Mildly distended - palpable hepatomegaly in the epigastrium.  Bowel sounds heard.    EXTREMITIES:  Warm.  No SAM.    SKIN:    No rash, bruising or purpura noted.  Thinning hair.      Lab Results:    Reviewed with patient.    Recent Results (from the past 24 hour(s))   Magnesium   Result Value Ref Range    Magnesium 1.6 (L) 1.8 - 2.6 mg/dL   Comprehensive Metabolic Panel   Result Value Ref Range    Sodium 141 136 - 145 mmol/L    Potassium 3.0 (L) 3.5 - 5.0 mmol/L    Chloride 105 98 - 107 mmol/L    CO2 25 22 - 31 mmol/L    Anion Gap, Calculation 11 5 - 18 mmol/L    Glucose 141 (H) 70 - 125 mg/dL    BUN 13 8 - 22 mg/dL    Creatinine 0.57 (L) 0.60 - 1.10 mg/dL    GFR MDRD Af Amer >60 >60  mL/min/1.73m2    GFR MDRD Non Af Amer >60 >60 mL/min/1.73m2    Bilirubin, Total 0.5 0.0 - 1.0 mg/dL    Calcium 8.8 8.5 - 10.5 mg/dL    Protein, Total 6.3 6.0 - 8.0 g/dL    Albumin 3.0 (L) 3.5 - 5.0 g/dL    Alkaline Phosphatase 195 (H) 45 - 120 U/L    AST 23 0 - 40 U/L    ALT 12 0 - 45 U/L   HM1 (CBC with Diff)   Result Value Ref Range    WBC 7.5 4.0 - 11.0 thou/uL    RBC 3.17 (L) 3.80 - 5.40 mill/uL    Hemoglobin 10.3 (L) 12.0 - 16.0 g/dL    Hematocrit 31.6 (L) 35.0 - 47.0 %     80 - 100 fL    MCH 32.5 27.0 - 34.0 pg    MCHC 32.6 32.0 - 36.0 g/dL    RDW 20.0 (H) 11.0 - 14.5 %    Platelets 121 (L) 140 - 440 thou/uL    MPV 9.3 8.5 - 12.5 fL    Neutrophils % 75 (H) 50 - 70 %    Lymphocytes % 14 (L) 20 - 40 %    Monocytes % 9 2 - 10 %    Eosinophils % 1 0 - 6 %    Basophils % 0 0 - 2 %    Neutrophils Absolute 5.6 2.0 - 7.7 thou/uL    Lymphocytes Absolute 1.0 0.8 - 4.4 thou/uL    Monocytes Absolute 0.7 0.0 - 0.9 thou/uL    Eosinophils Absolute 0.1 0.0 - 0.4 thou/uL    Basophils Absolute 0.0 0.0 - 0.2 thou/uL         Imaging:    No new imaging.

## 2021-06-04 NOTE — TELEPHONE ENCOUNTER
2nd attempt. Phone call to Glenis to discuss her genetic test results (negative). Glenis was not available, so I left a non-detailed voicemail with my contact information.    Farheen Wise MS, [facility]  Licensed Genetic Counselor  [facility]  [phone]

## 2021-06-04 NOTE — PROGRESS NOTES
Pt arrived ambulatory w/ grandson. Infusion completed. Port flushed/heparin locked & deaccessed. Pt reports feeling well, denies pain/nausea. Walked off w/ grandson.

## 2021-06-04 NOTE — PROGRESS NOTES
Pt here today for treatment following NP visit. She tolerated infusion well, mag and K replaced as well. CADD pump connected at 1400. She will RTC Friday at 1200 for pump dc

## 2021-06-04 NOTE — PROGRESS NOTES
Completed Glenis's treatment plan today. 5FU pump connected at 1620 and she will go to [place] [id number] on 1/5 at 1420 for disconnect. She was given verbal instructions with understanding. She left ambulatory with her daughter.  Chantal Leo

## 2021-06-04 NOTE — PATIENT INSTRUCTIONS - HE
Recent Results (from the past 24 hour(s))   Magnesium   Result Value Ref Range    Magnesium 1.6 (L) 1.8 - 2.6 mg/dL   Comprehensive Metabolic Panel   Result Value Ref Range    Sodium 141 136 - 145 mmol/L    Potassium 3.0 (L) 3.5 - 5.0 mmol/L    Chloride 105 98 - 107 mmol/L    CO2 25 22 - 31 mmol/L    Anion Gap, Calculation 11 5 - 18 mmol/L    Glucose 141 (H) 70 - 125 mg/dL    BUN 13 8 - 22 mg/dL    Creatinine 0.57 (L) 0.60 - 1.10 mg/dL    GFR MDRD Af Amer >60 >60 mL/min/1.73m2    GFR MDRD Non Af Amer >60 >60 mL/min/1.73m2    Bilirubin, Total 0.5 0.0 - 1.0 mg/dL    Calcium 8.8 8.5 - 10.5 mg/dL    Protein, Total 6.3 6.0 - 8.0 g/dL    Albumin 3.0 (L) 3.5 - 5.0 g/dL    Alkaline Phosphatase 195 (H) 45 - 120 U/L    AST 23 0 - 40 U/L    ALT 12 0 - 45 U/L   HM1 (CBC with Diff)   Result Value Ref Range    WBC 7.5 4.0 - 11.0 thou/uL    RBC 3.17 (L) 3.80 - 5.40 mill/uL    Hemoglobin 10.3 (L) 12.0 - 16.0 g/dL    Hematocrit 31.6 (L) 35.0 - 47.0 %     80 - 100 fL    MCH 32.5 27.0 - 34.0 pg    MCHC 32.6 32.0 - 36.0 g/dL    RDW 20.0 (H) 11.0 - 14.5 %    Platelets 121 (L) 140 - 440 thou/uL    MPV 9.3 8.5 - 12.5 fL    Neutrophils % 75 (H) 50 - 70 %    Lymphocytes % 14 (L) 20 - 40 %    Monocytes % 9 2 - 10 %    Eosinophils % 1 0 - 6 %    Basophils % 0 0 - 2 %    Neutrophils Absolute 5.6 2.0 - 7.7 thou/uL    Lymphocytes Absolute 1.0 0.8 - 4.4 thou/uL    Monocytes Absolute 0.7 0.0 - 0.9 thou/uL    Eosinophils Absolute 0.1 0.0 - 0.4 thou/uL    Basophils Absolute 0.0 0.0 - 0.2 thou/uL

## 2021-06-05 NOTE — PATIENT INSTRUCTIONS - HE
Recent Results (from the past 24 hour(s))   Magnesium   Result Value Ref Range    Magnesium 1.8 1.8 - 2.6 mg/dL   Comprehensive Metabolic Panel   Result Value Ref Range    Sodium 137 136 - 145 mmol/L    Potassium 3.6 3.5 - 5.0 mmol/L    Chloride 104 98 - 107 mmol/L    CO2 22 22 - 31 mmol/L    Anion Gap, Calculation 11 5 - 18 mmol/L    Glucose 112 70 - 125 mg/dL    BUN 18 8 - 22 mg/dL    Creatinine 0.52 (L) 0.60 - 1.10 mg/dL    GFR MDRD Af Amer >60 >60 mL/min/1.73m2    GFR MDRD Non Af Amer >60 >60 mL/min/1.73m2    Bilirubin, Total 0.5 0.0 - 1.0 mg/dL    Calcium 9.1 8.5 - 10.5 mg/dL    Protein, Total 6.7 6.0 - 8.0 g/dL    Albumin 2.9 (L) 3.5 - 5.0 g/dL    Alkaline Phosphatase 237 (H) 45 - 120 U/L    AST 29 0 - 40 U/L    ALT 16 0 - 45 U/L   HM1 (CBC with Diff)   Result Value Ref Range    WBC 7.3 4.0 - 11.0 thou/uL    RBC 2.73 (L) 3.80 - 5.40 mill/uL    Hemoglobin 9.3 (L) 12.0 - 16.0 g/dL    Hematocrit 29.1 (L) 35.0 - 47.0 %     (H) 80 - 100 fL    MCH 34.1 (H) 27.0 - 34.0 pg    MCHC 32.0 32.0 - 36.0 g/dL    RDW 17.9 (H) 11.0 - 14.5 %    Platelets 121 (L) 140 - 440 thou/uL    MPV 9.6 8.5 - 12.5 fL    Neutrophils % 77 (H) 50 - 70 %    Lymphocytes % 11 (L) 20 - 40 %    Monocytes % 8 2 - 10 %    Eosinophils % 3 0 - 6 %    Basophils % 0 0 - 2 %    Neutrophils Absolute 5.6 2.0 - 7.7 thou/uL    Lymphocytes Absolute 0.8 0.8 - 4.4 thou/uL    Monocytes Absolute 0.6 0.0 - 0.9 thou/uL    Eosinophils Absolute 0.2 0.0 - 0.4 thou/uL    Basophils Absolute 0.0 0.0 - 0.2 thou/uL

## 2021-06-05 NOTE — PROGRESS NOTES
Pt amb into clinic with daughter, Mona, for mag sulf infusion. Pt tolerated infusion very well. Port flushed, de-accessed by Letty MORELOS Pt and daughter amb out of clinic.

## 2021-06-05 NOTE — PROGRESS NOTES
[facility] Hematology and Oncology Progress Note    Patient: Glenis Pop  MRN: [id number]  Date of Service: 01/15/2020        Reason for Visit:    D1C10 palliative FOLFIRINOX chemotherapy + Neulasta support.    Assessment and Plan:    Cancer Staging    1. 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     Presented with mild abdominal discomfort and backache for ~2 months.      08/01/19 CT CAP - numerous liver mets, a 35 mm mass in the body of the pancreas, upper abdominal lymph nodes that were enlarged and a left renal lesion that appears to be metastasis.      08/15/19 ultrasound-guided core needle biopsy from 1 of the liver mets - poorly differentiated adenocarcinoma consistent with a pancreatic primary.     08/27/19 CEA level was slightly elevated at 4.4.      08/27/19 CA-19-9 was grossly elevated at more than 22,000.      08/30/19 NM PET - mass in the pancreas is PET positive.  Numerous metastasis are seen in the liver, lungs and abdominal lymph nodes.  The left abdominal lesion is not lighting up so it appears to be an adenoma.  The unexpected finding is a PET positive nodule in the proximal ascending colon.  This would be an unusual site of metastasis for the pancreas cancer.  It could be an advanced colon polyp or even a separate colon cancer.    Opted to monitor PET positive nodule in the colon since she already has widely metastatic pancreas cancer.  Most of the chemotherapy is for pancreas cancer should be effective for colon cancer also in case this is a form of cancer.      Palliative FOLFIRINOX chemotherapy was recommended starting at 80% dosing.      09/03/19 IR port-a-cath placed.    09/11/19 - C1 palliative FOLFIRINOX chemotherapy at 80% dosing + Neulasta.    09/25/19 - C2 palliative FOLFIRINOX chemotherapy at 90% dosing + Neulasta.     10/09/19 - C3 palliative FOLFIRINOX chemotherapy at 100% dosing + Neulasta.     11/04/19 NM PET - Partial response to  therapy with decreased size of the pancreatic body adenocarcinoma, liver metastasis, and upper abdominal lymph node metastasis. Unchanged bilateral pulmonary metastasis. Persistent focal FDG uptake in the ascending colon/cecum, 20% of which may represent benign/malignant polyps.     11/21/19 genetic consult - obtained [employer]Cancer genetic testing (37 genes) through [facility].  STAT request for BRCAPlus genes (including BRCA1 and BRCA2).    If she has a BRCA mutation we can consider treatment with a pop inhibitor if she has a good response to the initial chemotherapy.    If she is found to have a germline mutation that is pathogenic it would be helpful for her family since they can take measures to detect the cancer earlier or prevent it.      12/31/19 NM PET - Broadly stable disease with persistent pancreatic adenocarcinoma with liver, sheldon hepatis lymph node, and pulmonary metastasis. Unchanged focal FDG uptake in the ascending colon/cecum, 20% of which may represent benign/malignant polyps.    01/15/20:    CBC - HgB dropping @ 9.5.  WBC and ANC WNL.  Plts dropping @ 85K.    CMP - stable mildly elevated alk Phos.  Other LFTs WNL.  Improving albuminemia. Mildly elevated non-fasting BS.  Otherwise WNL.    Continue 3 Premier protein drinks/day each with 30 G protein.   Also eating high protein foods.    Continue 20 meq KDur daily.    Magnesium - 1.6.    Will give 4 grams IVPB today.    Will begin magox 400 mg daily.  Rx Eprescribed to her pharmacy.    CA19.9 - pending (48680, 59250, 36233, 87292, 35356, 05843, 61178, 27830, [id number] and [id number] on prior serial evaluations).    Glenis presents looking and feeling quite good and in good spirits accompanied by her daughter.  She states that she feels good.  She's been tolerating 100% FOLFIRINOX chemotherapy dosing since C3 without incident.  No constipation or diarrhea.  No nausea - no antiemetics.  Appetite good, weight slowly dropping, 15 pounds from start of  treatment.  Abdominal and back pain resolved - no recent Tylenol.  She lives alone but has family who stays with her as necessary.  No neuropathies.  Stable cold hypersensitivity to frozen food for 3-4 days post each treatment, then mostly resolves.      Ongoing disease response per recent imaging and continued drop in biochemical marker CA19.9.    I again reviewed potential/likely side effects palliative FOLFIRINOX chemotherapy.    With plts @ 85K will HOLD C10 palliative FOLFIRINOX chemotherapy @ 100% dosing + Neulasta one week.     I encouraged her to be proactive in treating any nausea.    She was instructed that if she would note a fever > 100.4F the Cancer clinic needs to be called immediately day or night as inpatient admission and antibiotics may be needed.    01/22/20 - follow up one week delayed D1C10 palliative FOLFIRINOX chemotherapy + Neulasta with labs per Treatment Plan.     NM PET before C13 FOLFIRINOX.      CA-19-9 level with each cycle.    2nd line therapy with Gemcitabine and Abraxane chemotherapy.      She has no symptoms from the PET positive cecal lesion.  This could be a pancreatic cancer metastasis or it could be a second cancer.  We will follow this clinically and if she has any symptoms plan for colonoscopy.    ECOG Performance:     ECOG Performance Status: 1    Distress Assessment:         Pain:    Pain Score (Initial OR Reassessment): 3        TT > 25 minutes face to face with > 50 % in counseling and coordination of care.    Leonidas Adams, CNP      CC: Austin Mendoza MD  __________________________________    Interim History:    Ms. Glenis Pop presents anticipating C10 palliative chemotherapy accompanied by her daughter.  She looks and feels quite good.  She's been tolerating 100% FOLFIRINOX chemotherapy dosing since C3 without incident.  She continues to joke that we should go to 110%.  Appetite good, but weight continues to slowly drop, 15 pounds from start of  treatment.  Abdominal and back pain resolved - no recent Tylenol.  No neuropathies.  Stable cold hypersensitivity to frozen food for 3-4 days post each treatment, then mostly resolves.  No constipation or diarrhea.  No nausea - no antiemetics.  She denies cough, fever, chills, unusual headaches, visual or mentation disturbance, bowel or bladder issues, rash.       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  She lives alone but has family who will be staying with her as necessary.      Pain Status:    Currently in Pain: No/denies    Review of Systems:    Constitutional  Constitutional (WDL): All constitutional elements are within defined limits  Neurosensory  Neurosensory (WDL): All neurosensory elements are within defined limits  Eye   Eye Disorder (WDL): All eye disorder elements are within defined limits  Ear  Ear Disorder (WDL): All ear disorder elements are within defined limits  Cardiovascular  Cardiovascular (WDL): All cardiovascular elements are within defined limits  Pulmonary  Respiratory (WDL): Exceptions to WDL  Cough: Mild symptoms, nonprescription intervention indicated  Gastrointestinal  Gastrointestinal (WDL): All gastrointestinal elements are within defined limits  Genitourinary  Genitourinary (WDL): All genitourinary elements are within defined limits  Lymphatic  Lymph (WDL): All lymph disorder elements are within defined limits  Musculoskeletal and Connective Tissue  Musculoskeletal and Connetive Tissue Disorders (WDL): All Musculoskeletal and Connetive Tissue Disorder elements are within defined limits  Integumentary  Integumentary (WDL): Exceptions to WDL  Alopecia: Hair loss of up to 50% of normal for that individual that is not obvious from a distance but only on close inspection, a different hair style may be required to cover the hair loss but it does not require a wig or hair piece to camouflage  Palmar-Plantar Erythrodysesthesia  Syndrome: Minimal skin changes or dermatitis (e.g., erythema, edema, or hyperkeratosis) without pain  Patient Coping  Patient Coping: Accepting  Distress Assessment     Accompanied by  Accompanied by: Family Member  Oral Chemo Adherence       Past History:    Past Medical History:   Diagnosis Date     Endometriosis      Hemorrhoids      Pancreatic cancer (H)          Past Surgical History:   Procedure Laterality Date     IR PORT PLACEMENT >5 YEARS  9/3/2019    Right IJ port.     PERCUTANEOUS LIVER BIOPSY  08/15/2019     TOTAL ABDOMINAL HYSTERECTOMY W/ BILATERAL SALPINGOOPHORECTOMY  1980       Physical Exam:    Recent Vitals 1/15/2020   Height -   Weight 135 lbs 2 oz   BSA (m2) 1.7 m2   /71   Pulse 97   Temp 98   Temp src 1   SpO2 97   Some recent data might be hidden       GENERAL:   Very pleasant.  Alert and oriented.  Comfortable.  In no acute distress.  Daughter accompanies.    HEENT:   Atraumatic and normocephalic. GAYATHRI.  EOMI.  No pallor.  No icterus.  No mucosal lesions.    LYMPH NODES:  No cervical, axillary or inguinal lymphadenopathy palpable.    CHEST:   Lungs clear bilaterally.    Port-A-Cath over the right upper chest looks unremarkable.    CVS:    S1 and S2 heard.  RRR.  No murmur, gallop or rub heard.  No peripheral edema.    ABDOMEN:   Soft.  Not tender.  Mildly distended - palpable hepatomegaly in the epigastrium.  Bowel sounds heard.    EXTREMITIES:  Warm.  No SAM.    SKIN:    No rash, bruising or purpura noted.  Thinning hair.      Lab Results:    Reviewed with patient.    Recent Results (from the past 24 hour(s))   Magnesium   Result Value Ref Range    Magnesium 1.6 (L) 1.8 - 2.6 mg/dL   Comprehensive Metabolic Panel   Result Value Ref Range    Sodium 137 136 - 145 mmol/L    Potassium 3.6 3.5 - 5.0 mmol/L    Chloride 105 98 - 107 mmol/L    CO2 20 (L) 22 - 31 mmol/L    Anion Gap, Calculation 12 5 - 18 mmol/L    Glucose 132 (H) 70 - 125 mg/dL    BUN 13 8 - 22 mg/dL    Creatinine 0.60 0.60 -  1.10 mg/dL    GFR MDRD Af Amer >60 >60 mL/min/1.73m2    GFR MDRD Non Af Amer >60 >60 mL/min/1.73m2    Bilirubin, Total 0.6 0.0 - 1.0 mg/dL    Calcium 9.1 8.5 - 10.5 mg/dL    Protein, Total 6.5 6.0 - 8.0 g/dL    Albumin 3.1 (L) 3.5 - 5.0 g/dL    Alkaline Phosphatase 198 (H) 45 - 120 U/L    AST 20 0 - 40 U/L    ALT 11 0 - 45 U/L   HM1 (CBC with Diff)   Result Value Ref Range    WBC 5.3 4.0 - 11.0 thou/uL    RBC 2.84 (L) 3.80 - 5.40 mill/uL    Hemoglobin 9.5 (L) 12.0 - 16.0 g/dL    Hematocrit 29.6 (L) 35.0 - 47.0 %     (H) 80 - 100 fL    MCH 33.5 27.0 - 34.0 pg    MCHC 32.1 32.0 - 36.0 g/dL    RDW 19.6 (H) 11.0 - 14.5 %    Platelets 85 (L) 140 - 440 thou/uL    MPV 9.9 8.5 - 12.5 fL    Neutrophils % 69 50 - 70 %    Lymphocytes % 16 (L) 20 - 40 %    Monocytes % 12 (H) 2 - 10 %    Eosinophils % 2 0 - 6 %    Basophils % 1 0 - 2 %    Neutrophils Absolute 3.7 2.0 - 7.7 thou/uL    Lymphocytes Absolute 0.9 0.8 - 4.4 thou/uL    Monocytes Absolute 0.6 0.0 - 0.9 thou/uL    Eosinophils Absolute 0.1 0.0 - 0.4 thou/uL    Basophils Absolute 0.0 0.0 - 0.2 thou/uL   Manual Differential   Result Value Ref Range    Platelet Estimate Decreased (!) Normal       Imaging:    No new imaging.

## 2021-06-05 NOTE — PROGRESS NOTES
Pt arrived at [facility] to see Leonidas PERSAUD. Pt has Pancreatic Cancer and is here for treatment.

## 2021-06-05 NOTE — PATIENT INSTRUCTIONS - HE
Recent Results (from the past 24 hour(s))   Magnesium   Result Value Ref Range    Magnesium 1.6 (L) 1.8 - 2.6 mg/dL   Comprehensive Metabolic Panel   Result Value Ref Range    Sodium 137 136 - 145 mmol/L    Potassium 3.6 3.5 - 5.0 mmol/L    Chloride 105 98 - 107 mmol/L    CO2 20 (L) 22 - 31 mmol/L    Anion Gap, Calculation 12 5 - 18 mmol/L    Glucose 132 (H) 70 - 125 mg/dL    BUN 13 8 - 22 mg/dL    Creatinine 0.60 0.60 - 1.10 mg/dL    GFR MDRD Af Amer >60 >60 mL/min/1.73m2    GFR MDRD Non Af Amer >60 >60 mL/min/1.73m2    Bilirubin, Total 0.6 0.0 - 1.0 mg/dL    Calcium 9.1 8.5 - 10.5 mg/dL    Protein, Total 6.5 6.0 - 8.0 g/dL    Albumin 3.1 (L) 3.5 - 5.0 g/dL    Alkaline Phosphatase 198 (H) 45 - 120 U/L    AST 20 0 - 40 U/L    ALT 11 0 - 45 U/L   HM1 (CBC with Diff)   Result Value Ref Range    WBC 5.3 4.0 - 11.0 thou/uL    RBC 2.84 (L) 3.80 - 5.40 mill/uL    Hemoglobin 9.5 (L) 12.0 - 16.0 g/dL    Hematocrit 29.6 (L) 35.0 - 47.0 %     (H) 80 - 100 fL    MCH 33.5 27.0 - 34.0 pg    MCHC 32.1 32.0 - 36.0 g/dL    RDW 19.6 (H) 11.0 - 14.5 %    Platelets 85 (L) 140 - 440 thou/uL    MPV 9.9 8.5 - 12.5 fL

## 2021-06-05 NOTE — PATIENT INSTRUCTIONS - HE
Recent Results (from the past 24 hour(s))   Magnesium   Result Value Ref Range    Magnesium 1.8 1.8 - 2.6 mg/dL   Comprehensive Metabolic Panel   Result Value Ref Range    Sodium 138 136 - 145 mmol/L    Potassium 4.3 3.5 - 5.0 mmol/L    Chloride 104 98 - 107 mmol/L    CO2 24 22 - 31 mmol/L    Anion Gap, Calculation 10 5 - 18 mmol/L    Glucose 142 (H) 70 - 125 mg/dL    BUN 14 8 - 22 mg/dL    Creatinine 0.57 (L) 0.60 - 1.10 mg/dL    GFR MDRD Af Amer >60 >60 mL/min/1.73m2    GFR MDRD Non Af Amer >60 >60 mL/min/1.73m2    Bilirubin, Total 0.6 0.0 - 1.0 mg/dL    Calcium 9.4 8.5 - 10.5 mg/dL    Protein, Total 6.7 6.0 - 8.0 g/dL    Albumin 3.0 (L) 3.5 - 5.0 g/dL    Alkaline Phosphatase 178 (H) 45 - 120 U/L    AST 28 0 - 40 U/L    ALT 11 0 - 45 U/L   HM1 (CBC with Diff)   Result Value Ref Range    WBC 8.6 4.0 - 11.0 thou/uL    RBC 2.87 (L) 3.80 - 5.40 mill/uL    Hemoglobin 9.7 (L) 12.0 - 16.0 g/dL    Hematocrit 30.7 (L) 35.0 - 47.0 %     (H) 80 - 100 fL    MCH 33.8 27.0 - 34.0 pg    MCHC 31.6 (L) 32.0 - 36.0 g/dL    RDW 19.6 (H) 11.0 - 14.5 %    Platelets 123 (L) 140 - 440 thou/uL    MPV 9.4 8.5 - 12.5 fL    Neutrophils % 77 (H) 50 - 70 %    Lymphocytes % 11 (L) 20 - 40 %    Monocytes % 9 2 - 10 %    Eosinophils % 2 0 - 6 %    Basophils % 0 0 - 2 %    Neutrophils Absolute 6.6 2.0 - 7.7 thou/uL    Lymphocytes Absolute 1.0 0.8 - 4.4 thou/uL    Monocytes Absolute 0.8 0.0 - 0.9 thou/uL    Eosinophils Absolute 0.2 0.0 - 0.4 thou/uL    Basophils Absolute 0.0 0.0 - 0.2 thou/uL

## 2021-06-05 NOTE — PROGRESS NOTES
Pt presented for labs, provider visit and poss chemo for pancreas cancer with mets to liver. Carli Kerr RN

## 2021-06-05 NOTE — PROGRESS NOTES
[facility] Hematology and Oncology Progress Note    Patient: Glenis Pop  MRN: [id number]  Date of Service: 01/22/2020        Reason for Visit:    One week delayed C10 palliative FOLFIRINOX chemotherapy + Neulasta support.    Assessment and Plan:    Cancer Staging    1. 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     Presented with mild abdominal discomfort and backache for ~2 months.      08/01/19 CT CAP - numerous liver mets, a 35 mm mass in the body of the pancreas, upper abdominal lymph nodes that were enlarged and a left renal lesion that appears to be metastasis.      08/15/19 ultrasound-guided core needle biopsy from 1 of the liver mets - poorly differentiated adenocarcinoma consistent with a pancreatic primary.     08/27/19 CEA level was slightly elevated at 4.4.      08/27/19 CA-19-9 was grossly elevated at more than 22,000.      08/30/19 NM PET - mass in the pancreas is PET positive.  Numerous metastasis are seen in the liver, lungs and abdominal lymph nodes.  The left abdominal lesion is not lighting up so it appears to be an adenoma.  The unexpected finding is a PET positive nodule in the proximal ascending colon.  This would be an unusual site of metastasis for the pancreas cancer.  It could be an advanced colon polyp or even a separate colon cancer.    Opted to monitor PET positive nodule in the colon since she already has widely metastatic pancreas cancer.  Most of the chemotherapy is for pancreas cancer should be effective for colon cancer also in case this is a form of cancer.      Palliative FOLFIRINOX chemotherapy was recommended starting at 80% dosing.      09/03/19 IR port-a-cath placed.    09/11/19 - C1 palliative FOLFIRINOX chemotherapy at 80% dosing + Neulasta.    09/25/19 - C2 palliative FOLFIRINOX chemotherapy at 90% dosing + Neulasta.     10/09/19 - C3 palliative FOLFIRINOX chemotherapy at 100% dosing + Neulasta.     11/04/19 NM PET -  Partial response to therapy with decreased size of the pancreatic body adenocarcinoma, liver metastasis, and upper abdominal lymph node metastasis. Unchanged bilateral pulmonary metastasis. Persistent focal FDG uptake in the ascending colon/cecum, 20% of which may represent benign/malignant polyps.     11/21/19 genetic consult - obtained CustomNext-Cancer genetic testing (37 genes) through [employer].  STAT request for BRCAPlus genes (including BRCA1 and BRCA2).    If she has a BRCA mutation we can consider treatment with a pop inhibitor if she has a good response to the initial chemotherapy.    If she is found to have a germline mutation that is pathogenic it would be helpful for her family since they can take measures to detect the cancer earlier or prevent it.      12/31/19 NM PET - Broadly stable disease with persistent pancreatic adenocarcinoma with liver, sheldon hepatis lymph node, and pulmonary metastasis. Unchanged focal FDG uptake in the ascending colon/cecum, 20% of which may represent benign/malignant polyps.    01/22/20:    CBC - HgB stable @ 9.5.  WBC and ANC WNL.  Plts increased @ 123K.    CMP - decreased mildly elevated alk Phos.  Other LFTs WNL.  Stable albuminemia. Mildly elevated non-fasting BS.  Otherwise WNL.    Continue 3 Premier protein drinks/day each with 30 G protein.   Also eating high protein foods.    Continue 20 meq KDur daily.    Magnesium - WNL @ 1.8.    Glenis tried magox 400 mg daily and got diarrhea so stopped.    CA19.9 - pending (8760, 27617, 14053, 62718, 80747, 18936, 84354, 81393, 58206, 97586 and 13720 on prior serial evaluations).    Glenis presents looking and feeling quite good and in good spirits accompanied by her daughter.  She enjoyed her week break due to thrombocytopenia.  She's otherwise been subjectively tolerating 100% FOLFIRINOX chemotherapy dosing since C3 without incident.  No constipation.  Noted diarrhea when taking MagOx so stopped taking it.  No nausea - no  antiemetics.  Appetite good, however weight slowly dropping, ~20 pounds from start of treatment.  Abdominal and back pain resolved - no recent Tylenol.  She lives alone but has family who stays with her as necessary.  No neuropathies.  Stable cold hypersensitivity to frozen food for 3-4 days post each treatment, then mostly resolves.      Ongoing disease response per recent imaging and continued drop in biochemical marker CA19.9.    I again reviewed potential/likely side effects palliative FOLFIRINOX chemotherapy.    With plts yet a bit low after 1-week HOLD C10, will proceed with  C10 palliative FOLFIRINOX chemotherapy + Neulasta, but HOLD the bolus 5FU.     I encouraged her to be proactive in treating any nausea.    She was instructed that if she would note a fever > 100.4F the Cancer clinic needs to be called immediately day or night as inpatient admission and antibiotics may be needed.    02/05/20 - D1C11 palliative dose modified FOLFIRINOX chemotherapy + Neulasta with labs per Treatment Plan.     NM PET before C13 FOLFIRINOX.      CA-19-9 level with each cycle.    2nd line therapy with Gemcitabine and Abraxane chemotherapy.      She has no symptoms from the PET positive cecal lesion.  This could be a pancreatic cancer metastasis or it could be a second cancer.  We will follow this clinically and if she has any symptoms plan for colonoscopy.    ECOG Performance:     ECOG Performance Status: 0    Distress Assessment:    Distress Assessment Score: No distress    Pain:    Pain Score (Initial OR Reassessment): No/Denies Pain        TT > 25 minutes face to face with > 50 % in counseling and coordination of care.    Leonidas Adams, CNP      CC: Austin Mendoza MD  __________________________________    Interim History:    Ms. Glenis Pop presents anticipating one week delayed C10 palliative chemotherapy accompanied by her daughter.  She looks and feels quite good and enjoyed her week break due to  thrombocytopenia.  She's otherwise been subjectively tolerating 100% dosing FOLFIRINOX chemotherapy very well since C3 and without incident.  No constipation.  Noted diarrhea when taking MagOx so stopped taking it.  No nausea - no antiemetics.  Appetite good, however weight slowly dropping, ~20 pounds from start of treatment.  Abdominal and back pain resolved - no recent Tylenol.  She lives alone but has family who stays with her as necessary.  No neuropathies.  Stable cold hypersensitivity to frozen food for 3-4 days post each treatment, then mostly resolves.  She denies cough, fever, chills, unusual headaches, visual or mentation disturbance, bladder issues, rash.       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  She lives alone but has family who will be staying with her as necessary.      Pain Status:    Currently in Pain: No/denies    Review of Systems:    Constitutional  Constitutional (WDL): Exceptions to WDL  Fatigue: Fatigue relieved by rest  Neurosensory  Neurosensory (WDL): All neurosensory elements are within defined limits  Peripheral Sensory Neuropathy: Asymptomatic, loss of deep tendon reflexes or paresthesia(sensitive to cold after chemo)  Eye   Eye Disorder (WDL): All eye disorder elements are within defined limits  Ear  Ear Disorder (WDL): All ear disorder elements are within defined limits  Cardiovascular     Pulmonary  Respiratory (WDL): Within Defined Limits  Gastrointestinal  Gastrointestinal (WDL): Exceptions to WDL  Dry Mouth: Symptomatic (e.g., dry or thick saliva) without significant dietary alteration, unstimulated saliva flow >0.2 ml/min  Genitourinary  Genitourinary (WDL): All genitourinary elements are within defined limits  Lymphatic  Lymph (WDL): All lymph disorder elements are within defined limits  Musculoskeletal and Connective Tissue  Musculoskeletal and Connetive Tissue Disorders (WDL): All Musculoskeletal and Connetive  Tissue Disorder elements are within defined limits  Muscle Weakness : Symptomatic, perceived by patient but not evident on physical exam  Integumentary  Integumentary (WDL): Exceptions to WDL  Alopecia: Hair loss of up to 50% of normal for that individual that is not obvious from a distance but only on close inspection, a different hair style may be required to cover the hair loss but it does not require a wig or hair piece to camouflage  Palmar-Plantar Erythrodysesthesia Syndrome: Minimal skin changes or dermatitis (e.g., erythema, edema, or hyperkeratosis) without pain  Patient Coping  Patient Coping: Accepting;Open/discussion  Distress Assessment  Distress Assessment Score: No distress  Accompanied by  Accompanied by: Family Member  Oral Chemo Adherence       Past History:    Past Medical History:   Diagnosis Date     Endometriosis      Hemorrhoids      Pancreatic cancer (H)          Past Surgical History:   Procedure Laterality Date     IR PORT PLACEMENT >5 YEARS  9/3/2019    Right IJ port.     PERCUTANEOUS LIVER BIOPSY  08/15/2019     TOTAL ABDOMINAL HYSTERECTOMY W/ BILATERAL SALPINGOOPHORECTOMY  1980       Physical Exam:    Recent Vitals 1/22/2020   Height -   Weight 135 lbs 3 oz   BSA (m2) 1.7 m2   /75   Pulse 108   Temp 98.2   Temp src 1   SpO2 97   Some recent data might be hidden       GENERAL:   Very pleasant.  Alert and oriented.  Comfortable.  In no acute distress.  Daughter accompanies.    HEENT:   Atraumatic and normocephalic. GAYATHRI.  EOMI.  No pallor.  No icterus.  No mucosal lesions.    LYMPH NODES:  No cervical, axillary or inguinal lymphadenopathy palpable.    CHEST:   Lungs clear bilaterally.    Port-A-Cath over the right upper chest looks unremarkable.    CVS:    S1 and S2 heard.  RRR.  No murmur, gallop or rub heard.  No peripheral edema.    ABDOMEN:   Soft.  Not tender.  Mildly distended with palpable hepatomegaly in the epigastrium.  Bowel sounds heard.    EXTREMITIES:  Warm.  No  SAM.    SKIN:    No rash, bruising or purpura noted.  Thinning hair.      Lab Results:    Reviewed with patient.    Recent Results (from the past 24 hour(s))   Magnesium   Result Value Ref Range    Magnesium 1.8 1.8 - 2.6 mg/dL   Comprehensive Metabolic Panel   Result Value Ref Range    Sodium 138 136 - 145 mmol/L    Potassium 4.3 3.5 - 5.0 mmol/L    Chloride 104 98 - 107 mmol/L    CO2 24 22 - 31 mmol/L    Anion Gap, Calculation 10 5 - 18 mmol/L    Glucose 142 (H) 70 - 125 mg/dL    BUN 14 8 - 22 mg/dL    Creatinine 0.57 (L) 0.60 - 1.10 mg/dL    GFR MDRD Af Amer >60 >60 mL/min/1.73m2    GFR MDRD Non Af Amer >60 >60 mL/min/1.73m2    Bilirubin, Total 0.6 0.0 - 1.0 mg/dL    Calcium 9.4 8.5 - 10.5 mg/dL    Protein, Total 6.7 6.0 - 8.0 g/dL    Albumin 3.0 (L) 3.5 - 5.0 g/dL    Alkaline Phosphatase 178 (H) 45 - 120 U/L    AST 28 0 - 40 U/L    ALT 11 0 - 45 U/L   HM1 (CBC with Diff)   Result Value Ref Range    WBC 8.6 4.0 - 11.0 thou/uL    RBC 2.87 (L) 3.80 - 5.40 mill/uL    Hemoglobin 9.7 (L) 12.0 - 16.0 g/dL    Hematocrit 30.7 (L) 35.0 - 47.0 %     (H) 80 - 100 fL    MCH 33.8 27.0 - 34.0 pg    MCHC 31.6 (L) 32.0 - 36.0 g/dL    RDW 19.6 (H) 11.0 - 14.5 %    Platelets 123 (L) 140 - 440 thou/uL    MPV 9.4 8.5 - 12.5 fL    Neutrophils % 77 (H) 50 - 70 %    Lymphocytes % 11 (L) 20 - 40 %    Monocytes % 9 2 - 10 %    Eosinophils % 2 0 - 6 %    Basophils % 0 0 - 2 %    Neutrophils Absolute 6.6 2.0 - 7.7 thou/uL    Lymphocytes Absolute 1.0 0.8 - 4.4 thou/uL    Monocytes Absolute 0.8 0.0 - 0.9 thou/uL    Eosinophils Absolute 0.2 0.0 - 0.4 thou/uL    Basophils Absolute 0.0 0.0 - 0.2 thou/uL       Imaging:    No new imaging.

## 2021-06-05 NOTE — PROGRESS NOTES
Patient ambulated into [facility] accompanied by her daughter, Mona. Patient is alert and orikarly marques an dVSS. PPE donned and CADD pump disconnected. PORT flushed with Normal Saline and 6 cc Heparin and deaccessed. Dry 2 x 2 gauze taped over PORT site. Patient received Neulatsa 6 mg sub q injection in right upper outer arm. Patient was excited to be going to watch her granddaughter play basketball this weekend.

## 2021-06-05 NOTE — PROGRESS NOTES
[facility] Hematology and Oncology Progress Note    Patient: Glenis Pop  MRN: [id number]  Date of Service: 02/05/2020        Reason for Visit:    D1C11 palliative FOLFIRINOX chemotherapy + Neulasta support.    Assessment and Plan:    Cancer Staging    1. 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     Presented with mild abdominal discomfort and backache for ~2 months.      08/01/19 CT CAP - numerous liver mets, a 35 mm mass in the body of the pancreas, upper abdominal lymph nodes that were enlarged and a left renal lesion that appears to be metastasis.      08/15/19 ultrasound-guided core needle biopsy from 1 of the liver mets - poorly differentiated adenocarcinoma consistent with a pancreatic primary.     08/27/19 CEA level was slightly elevated at 4.4.      08/27/19 CA-19-9 was grossly elevated at more than 22,000.      08/30/19 NM PET - mass in the pancreas is PET positive.  Numerous metastasis are seen in the liver, lungs and abdominal lymph nodes.  The left abdominal lesion is not lighting up so it appears to be an adenoma.  The unexpected finding is a PET positive nodule in the proximal ascending colon.  This would be an unusual site of metastasis for the pancreas cancer.  It could be an advanced colon polyp or even a separate colon cancer.    Opted to monitor PET positive nodule in the colon since she already has widely metastatic pancreas cancer.  Most of the chemotherapy is for pancreas cancer should be effective for colon cancer also in case this is a form of cancer.      Palliative FOLFIRINOX chemotherapy was recommended starting at 80% dosing.      09/03/19 IR port-a-cath placed.    09/11/19 - C1 palliative FOLFIRINOX chemotherapy at 80% dosing + Neulasta.    09/25/19 - C2 palliative FOLFIRINOX chemotherapy at 90% dosing + Neulasta.     10/09/19 - C3 palliative FOLFIRINOX chemotherapy at 100% dosing + Neulasta.     11/04/19 NM PET - Partial response to  therapy with decreased size of the pancreatic body adenocarcinoma, liver metastasis, and upper abdominal lymph node metastasis. Unchanged bilateral pulmonary metastasis. Persistent focal FDG uptake in the ascending colon/cecum, 20% of which may represent benign/malignant polyps.     11/21/19 genetic consult - obtained CustomNext-Cancer genetic testing (37 genes) through [employer].  STAT request for BRCAPlus genes (including BRCA1 and BRCA2).    If she has a BRCA mutation we can consider treatment with a pop inhibitor if she has a good response to the initial chemotherapy.    If she is found to have a germline mutation that is pathogenic it would be helpful for her family since they can take measures to detect the cancer earlier or prevent it.      12/31/19 NM PET - Broadly stable disease with persistent pancreatic adenocarcinoma with liver, sheldon hepatis lymph node, and pulmonary metastasis. Unchanged focal FDG uptake in the ascending colon/cecum, 20% of which may represent benign/malignant polyps.    02/05/20:    CBC - HgB stable @ 9.3.  WBC and ANC WNL.  Plts stable @ 121K.    CMP - Mildly elevated alk phos.  Other LFTs WNL.  Stable hypoalbuminemia. Otherwise WNL.    Continue 4 Premier protein drinks/day each with 30 G protein.   Also eating high protein foods.    Continue 20 meq KDur daily.    Magnesium - WNL @ 1.8.    Glenis tried magox 400 mg daily and got diarrhea so stopped.    CA19.9 - pending (12030, 8760, 96242, 59037, 42451, 27871, 12923, 91586, 33198, 63545, 55872 and 83933 on prior serial evaluations).    Glenis presents looking and feeling quite good and in good spirits accompanied by her daughter.  She's been subjectively tolerating 100% FOLFIRINOX chemotherapy dosing since C3 without incident.  However the bolus 5FU was HELD on C10 due to lingering thrombocytopenia requiring a week HOLD of chemotherapy.  No constipation.  Noted diarrhea when taking MagOx so stopped taking it.  No nausea - no  antiemetics.  Appetite good, however weight slowly dropping, ~20 pounds from start of treatment.  Abdominal and back pain resolved - no recent Tylenol.  She lives alone but has family who stays with her as necessary.  Stable cold hypersensitivity to frozen food for 3-4 days post each treatment, then mostly resolves.      Last biochemical marker CA19.9 slightly increased.    I again reviewed potential/likely side effects palliative FOLFIRINOX chemotherapy.    Proceed with C11 palliative FOLFIRINOX chemotherapy + Neulasta,     Bolus 5FU was HELD C10 due to lingering thrombocytopenia.  But with rise in CA19.9 and stable Plts elsie reinstate for this cycle.     I encouraged her to be proactive in treating any nausea.    She was instructed that if she would note a fever > 100.4F the Cancer clinic needs to be called immediately day or night as inpatient admission and antibiotics may be needed.    02/19/20 - D1C12 palliative dose modified FOLFIRINOX chemotherapy + Neulasta with labs per Treatment Plan.     NM PET before C13 FOLFIRINOX.      CA-19-9 level with each cycle.    2nd line therapy with Gemcitabine and Abraxane chemotherapy.      She has no symptoms from the PET positive cecal lesion.  This could be a pancreatic cancer metastasis or it could be a second cancer.  We will follow this clinically and if she has any symptoms plan for colonoscopy.    ECOG Performance:     ECOG Performance Status: 0    Distress Assessment:    Distress Assessment Score: No distress    Pain:    Pain Score (Initial OR Reassessment): No/Denies Pain        TT > 25 minutes face to face with > 50 % in counseling and coordination of care.    Leonidas Adams, CNP      CC: Austin Mendoza MD  __________________________________    Interim History:    Ms. Glenis Pop presents anticipating C11 palliative chemotherapy accompanied by her daughter.  She looks and feels quite good.  She's been subjectively tolerating 100% dosing  FOLFIRINOX chemotherapy very well since C3 and without incident.  On C10 the bolus 5FU was held due to linngering thrombocytopenia.  No constipation.  Noted diarrhea when taking MagOx so stopped taking it.  No nausea - no antiemetics.  Appetite good, weight now holding after dropping, ~20 pounds from start of treatment.  Abdominal and back pain resolved - no recent Tylenol.  She lives alone but has family who stays with her as necessary.  Stable cold hypersensitivity to frozen food for 3-4 days post each treatment with some finger tip tingling, then  resolves.  She denies cough, fever, chills, unusual headaches, visual or mentation disturbance, bladder issues, rash.       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  She lives alone but has family who will be staying with her as necessary.      Pain Status:    Currently in Pain: No/denies    Review of Systems:    Constitutional  Constitutional (WDL): Exceptions to WDL  Neurosensory  Neurosensory (WDL): Exceptions to WDL  Peripheral Sensory Neuropathy: Asymptomatic, loss of deep tendon reflexes or paresthesia(fingertips with the cold)  Eye   Eye Disorder (WDL): All eye disorder elements are within defined limits  Ear  Ear Disorder (WDL): All ear disorder elements are within defined limits  Cardiovascular  Cardiovascular (WDL): All cardiovascular elements are within defined limits  Pulmonary  Respiratory (WDL): Exceptions to WDL  Cough: Mild symptoms, nonprescription intervention indicated  Gastrointestinal  Gastrointestinal (WDL): All gastrointestinal elements are within defined limits  Genitourinary  Genitourinary (WDL): All genitourinary elements are within defined limits  Lymphatic  Lymph (WDL): All lymph disorder elements are within defined limits  Musculoskeletal and Connective Tissue  Musculoskeletal and Connetive Tissue Disorders (WDL): All Musculoskeletal and Connetive Tissue Disorder elements are within  defined limits  Integumentary  Integumentary (WDL): Exceptions to WDL  Alopecia: Hair loss of up to 50% of normal for that individual that is not obvious from a distance but only on close inspection, a different hair style may be required to cover the hair loss but it does not require a wig or hair piece to camouflage  Palmar-Plantar Erythrodysesthesia Syndrome: Minimal skin changes or dermatitis (e.g., erythema, edema, or hyperkeratosis) without pain  Patient Coping  Patient Coping: Accepting;Open/discussion  Distress Assessment  Distress Assessment Score: No distress  Accompanied by  Accompanied by: Family Member  Oral Chemo Adherence       Past History:    Past Medical History:   Diagnosis Date     Endometriosis      Hemorrhoids      Pancreatic cancer (H)          Past Surgical History:   Procedure Laterality Date     IR PORT PLACEMENT >5 YEARS  9/3/2019    Right IJ port.     PERCUTANEOUS LIVER BIOPSY  08/15/2019     TOTAL ABDOMINAL HYSTERECTOMY W/ BILATERAL SALPINGOOPHORECTOMY  1980       Physical Exam:    Recent Vitals 2/5/2020   Weight 135 lbs 3 oz   BSA (m2) 1.7 m2   /67   Pulse 99   Temp 98.2   Temp src 1   SpO2 97   Some recent data might be hidden       GENERAL:   Pleasant.  Alert and oriented.  Comfortable.  In no acute distress.  Daughter accompanies.    HEENT:   Atraumatic and normocephalic. GAYATHRI.  EOMI.  No pallor.  No icterus.  No mucosal lesions.    LYMPH NODES:  No cervical, axillary or inguinal lymphadenopathy palpable.    CHEST:   Lungs clear bilaterally.    Port-A-Cath over the right upper chest looks unremarkable.    CVS:    S1 and S2 heard.  RRR.  No murmur, gallop or rub heard.  No peripheral edema.    ABDOMEN:   Soft.  Not tender.  Mildly distended with palpable hepatomegaly in the epigastrium.  Bowel sounds heard.    EXTREMITIES:  Warm.  No SAM.    SKIN:    No rash, bruising or purpura noted.        Lab Results:    Reviewed with patient.    Recent Results (from the past 24 hour(s))    Magnesium   Result Value Ref Range    Magnesium 1.8 1.8 - 2.6 mg/dL   Comprehensive Metabolic Panel   Result Value Ref Range    Sodium 137 136 - 145 mmol/L    Potassium 3.6 3.5 - 5.0 mmol/L    Chloride 104 98 - 107 mmol/L    CO2 22 22 - 31 mmol/L    Anion Gap, Calculation 11 5 - 18 mmol/L    Glucose 112 70 - 125 mg/dL    BUN 18 8 - 22 mg/dL    Creatinine 0.52 (L) 0.60 - 1.10 mg/dL    GFR MDRD Af Amer >60 >60 mL/min/1.73m2    GFR MDRD Non Af Amer >60 >60 mL/min/1.73m2    Bilirubin, Total 0.5 0.0 - 1.0 mg/dL    Calcium 9.1 8.5 - 10.5 mg/dL    Protein, Total 6.7 6.0 - 8.0 g/dL    Albumin 2.9 (L) 3.5 - 5.0 g/dL    Alkaline Phosphatase 237 (H) 45 - 120 U/L    AST 29 0 - 40 U/L    ALT 16 0 - 45 U/L   HM1 (CBC with Diff)   Result Value Ref Range    WBC 7.3 4.0 - 11.0 thou/uL    RBC 2.73 (L) 3.80 - 5.40 mill/uL    Hemoglobin 9.3 (L) 12.0 - 16.0 g/dL    Hematocrit 29.1 (L) 35.0 - 47.0 %     (H) 80 - 100 fL    MCH 34.1 (H) 27.0 - 34.0 pg    MCHC 32.0 32.0 - 36.0 g/dL    RDW 17.9 (H) 11.0 - 14.5 %    Platelets 121 (L) 140 - 440 thou/uL    MPV 9.6 8.5 - 12.5 fL    Neutrophils % 77 (H) 50 - 70 %    Lymphocytes % 11 (L) 20 - 40 %    Monocytes % 8 2 - 10 %    Eosinophils % 3 0 - 6 %    Basophils % 0 0 - 2 %    Neutrophils Absolute 5.6 2.0 - 7.7 thou/uL    Lymphocytes Absolute 0.8 0.8 - 4.4 thou/uL    Monocytes Absolute 0.6 0.0 - 0.9 thou/uL    Eosinophils Absolute 0.2 0.0 - 0.4 thou/uL    Basophils Absolute 0.0 0.0 - 0.2 thou/uL       Imaging:    No new imaging.

## 2021-06-05 NOTE — PROGRESS NOTES
Pt amb into clinic with daughter, Mona, for chemo infusion. Reviewed plan of care. Pt premedicated. Pt tolerated infusions well.  5fu via CADD pump programmed, reviewed. Pt and daughter amb out of clinic.

## 2021-06-06 NOTE — PATIENT INSTRUCTIONS - HE
Recent Results (from the past 24 hour(s))   Magnesium    Collection Time: 02/19/20  8:40 AM   Result Value Ref Range    Magnesium 1.8 1.8 - 2.6 mg/dL   Comprehensive Metabolic Panel    Collection Time: 02/19/20  8:40 AM   Result Value Ref Range    Sodium 137 136 - 145 mmol/L    Potassium 3.4 (L) 3.5 - 5.0 mmol/L    Chloride 101 98 - 107 mmol/L    CO2 24 22 - 31 mmol/L    Anion Gap, Calculation 12 5 - 18 mmol/L    Glucose 151 (H) 70 - 125 mg/dL    BUN 13 8 - 22 mg/dL    Creatinine 0.60 0.60 - 1.10 mg/dL    GFR MDRD Af Amer >60 >60 mL/min/1.73m2    GFR MDRD Non Af Amer >60 >60 mL/min/1.73m2    Bilirubin, Total 0.9 0.0 - 1.0 mg/dL    Calcium 8.8 8.5 - 10.5 mg/dL    Protein, Total 6.4 6.0 - 8.0 g/dL    Albumin 2.9 (L) 3.5 - 5.0 g/dL    Alkaline Phosphatase 217 (H) 45 - 120 U/L    AST 26 0 - 40 U/L    ALT 14 0 - 45 U/L   HM1 (CBC with Diff)    Collection Time: 02/19/20  8:40 AM   Result Value Ref Range    WBC 7.2 4.0 - 11.0 thou/uL    RBC 2.99 (L) 3.80 - 5.40 mill/uL    Hemoglobin 10.1 (L) 12.0 - 16.0 g/dL    Hematocrit 31.6 (L) 35.0 - 47.0 %     (H) 80 - 100 fL    MCH 33.8 27.0 - 34.0 pg    MCHC 32.0 32.0 - 36.0 g/dL    RDW 17.4 (H) 11.0 - 14.5 %    Platelets 126 (L) 140 - 440 thou/uL    MPV 10.3 8.5 - 12.5 fL    Neutrophils % 78 (H) 50 - 70 %    Lymphocytes % 10 (L) 20 - 40 %    Monocytes % 9 2 - 10 %    Eosinophils % 2 0 - 6 %    Basophils % 0 0 - 2 %    Neutrophils Absolute 5.6 2.0 - 7.7 thou/uL    Lymphocytes Absolute 0.8 0.8 - 4.4 thou/uL    Monocytes Absolute 0.7 0.0 - 0.9 thou/uL    Eosinophils Absolute 0.1 0.0 - 0.4 thou/uL    Basophils Absolute 0.0 0.0 - 0.2 thou/uL

## 2021-06-06 NOTE — PROGRESS NOTES
[facility] Hematology and Oncology Progress Note    Patient: Glenis Pop  MRN: [id number]  Date of Service: 02/19/2020        Reason for Visit    Follow-up regarding metastatic pancreas cancer.    Assessment and Plan  Cancer Staging  Malignant neoplasm of body of pancreas (H)  Staging form: Exocrine Pancreas, AJCC 8th Edition  - Clinical stage from 8/27/2019: Stage IV (cT2, cN2, pM1) - Signed by Ashia Kahn MD on 8/27/2019      ECOG Performance   ECOG Performance Status: 0    Distress Assessment  Distress Assessment Score: 2(visit today): Concerns about how she is doing with treatment.    Pain  Pain Score (Initial OR Reassessment): No/Denies Pain: No pain now.    Ms. Glenis Pop is a 69 y.o. woman who presents with some complaints of mild abdominal discomfort and backache over 2 months.  CT scan of the abdomen and pelvis that was done on 8/1/2019 showed numerous liver mets, a 35 mm mass in the body of the pancreas, upper abdominal lymph nodes that were enlarged and a left renal lesion that appears to be metastasis.  She had an ultrasound-guided core needle biopsy from 1 of the liver mets on 8/15/2019 which showed a poorly differentiated adenocarcinoma consistent with a pancreatic primary.   The CEA level was slightly elevated at 4.4.  The CA-19-9 was grossly elevated at more than 22,000.  I reviewed the results with her.  The PET CT scan done on 8/30/2019showed the mass in the pancreas that is PET positive.  Numerous metastases are seen in the liver, lungs and abdominal lymph nodes.  The left abdominal lesion is not lighting up so it appears to be an adenoma.  The unexpected finding is a PET positive nodule in the proximal ascending colon.This could be an unusual site of metastasis for the pancreas cancer.  This could also turn out to be an advanced colon polyp or even a separate colon cancer.  We decided to watch it in the subsequent imaging studies.    He has had a genetic counseling and genetic testing and  we did not find any germline mutation that is suspicious like BRCA 1 or 2.  We did request for Foundation one mutation testing on the liver biopsy sample but we were informed that there was insufficient tissue for doing the test.    She started chemotherapy with FOLFIRINOX on 9/11/2019.  The first cycle was given at 80% dose and with Neulasta support.  The second cycle was given at the 90% dose and we could go to 100% dose with cycle #3.    In the PET scan that was done after 4 cycles of chemotherapy, there was a partial response.  In the PET scan that was done after 8 cycles of chemotherapy the appearance was that of stable disease but the CA-19-9 level was still coming down.    1.  She has now completed 11 cycles of chemotherapy.  Overall I think she has tolerated chemotherapy fairly well.  The CA-19-9 level continues to decline.  Labs from today were reviewed.  Hemoglobin is 10.1.  WBC count is normal.  Platelet count is slightly below normal at 126,000.  I think this is good enough to proceed with cycle #12 of chemotherapy and she feels ready.  We will go ahead with FOLFIRINOX, cycle #12 at full dose with Neulasta support starting today.    2.  I am sending a new prescription for dexamethasone tablets to her pharmacy.    3.  She still has a mild hypokalemia at 3.4.  She should continue with the potassium chloride tablets at home and magnesium oxide tablets.  We will give her an additional 20 mEq of potassium chloride liquid in the infusion room today.    4.  She has some thinning of her hair.  However she does not have full alopecia.  She is happy about that.  She is accepting of the situation.    5.  I reviewed with her that the CA 19 9 level continues to come down so it appears that she is still responding.  I think we do need to obtain a PET CT scan at the end of the cycle to see what is happening inside her.  The abdominal distention that she has does concern me.  I wonder whether she has some peritoneal  disease.  We will see what the PET CT scan shows.    6.  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  This is mostly tingling in her fingertips and toes.  She has not really perceived numbness.  This is not causing any functional problems.  We will continue to monitor closely.  If this becomes a significant problem, then we may need to consider reducing dose.    7.  Follow-up: Return to clinic with MD or NP in 2 weeks with labs according to treatment plan and appointment for cycle #13 of chemotherapy.  I have ordered a PET CT scan to be done a day or 2 before that appointment.    Time spend >25 minutes face to face with the patient. More than 50 % in counseling and coordination of care.          Problem List    1. Malignant neoplasm of body of pancreas (H)  CC OFFICE VISIT LONG    dexAMETHasone (DECADRON) 4 MG tablet    NM PET CT Skull to Mid Thigh   2. Encounter for antineoplastic chemotherapy  CC OFFICE VISIT LONG    dexAMETHasone (DECADRON) 4 MG tablet    NM PET CT Skull to Mid Thigh   3. Liver metastases (H)  NM PET CT Skull to Mid Thigh        CC: Austin Mendoza MD    ______________________________________________________________________________    History of Present Illness    Ms. Glenis Pop is here for follow-up with her daughter.    She states that overall she feels that she is doing okay.  She feels that she is tolerating chemotherapy well so far.  She says that her energy is okay and she does get a lot accomplished.  She is not complaining of any pain.    She states that she is eating okay.  Nausea and vomiting has not been a problem.  She has not had any recent mouth sores.    Abdomen feels fine.  Bowel movements are normal.  She has not noticed any blood in stool or black stools.    Hair is thinning and she is a bit worried about that but accepting.    She says that she has little bit of tingling in her fingertips and also in her toes.  She has not really  perceived any numbness.  This is not causing any problems for her.    She would like to have a new prescription for dexamethasone sent to her pharmacy.    No fevers.  No night sweats.  No lumps or bumps anywhere.  No unusual headaches.  No eyesight problems.  No dizziness.  No mouth sores.  No swallowing difficulty.  No nausea or vomiting.  Breathing is fine.  No chest pain or cough.  No abdominal pain.  No constipation or diarrhea.  No blood in stool or black stools.  No difficulty with urination.  No skin rashes.    Please see below.  A 14 point review of system is otherwise completely negative.      Pain Status  Currently in Pain: No/denies    Review of Systems    Constitutional  Constitutional (WDL): Exceptions to WDL  Fatigue: Fatigue relieved by rest  Fever: None  Chills: None  Weight Gain: 5 - <10% from baseline(1# 2/5/20)  Weight Loss: None  Neurosensory  Neurosensory (WDL): Exceptions to WDL  Peripheral Motor Neuropathy: None  Ataxia: None  Peripheral Sensory Neuropathy: Asymptomatic, loss of deep tendon reflexes or paresthesia(fingertips and toes, notices more when sitting and not moving)  Confusion: None  Syncope: None  Cardiovascular  Cardiovascular (WDL): All cardiovascular elements are within defined limits  Pulmonary  Respiratory (WDL): Exceptions to WDL  Cough: Mild symptoms, nonprescription intervention indicated(occ, dry)  Dyspnea: None  Hypoxia: None  Gastrointestinal  Gastrointestinal (WDL): All gastrointestinal elements are within defined limits  Genitourinary  Genitourinary (WDL): All genitourinary elements are within defined limits  Integumentary  Integumentary (WDL): Exceptions to WDL  Alopecia: Hair loss of up to 50% of normal for that individual that is not obvious from a distance but only on close inspection, a different hair style may be required to cover the hair loss but it does not require a wig or hair piece to camouflage  Rash Maculo-Papular: None  Pruritus: None  Urticaria:  None  Palmar-Plantar Erythrodysesthesia Syndrome: None  Flushing: None  Patient Coping  Patient Coping: Accepting;Open/discussion  Distress Assessment  Distress Assessment Score: 2(visit today)  Accompanied by  Accompanied by: Family Member(Daughter, Mona)    Past History  Past Medical History:   Diagnosis Date     Endometriosis      Hemorrhoids      Pancreatic cancer (H)          Past Surgical History:   Procedure Laterality Date     IR PORT PLACEMENT >5 YEARS  9/3/2019    Right IJ port.     PERCUTANEOUS LIVER BIOPSY  08/15/2019     TOTAL ABDOMINAL HYSTERECTOMY W/ BILATERAL SALPINGOOPHORECTOMY  1980       Physical Exam    Recent Vitals 2/19/2020   Weight 136 lbs 10 oz   BSA (m2) 1.71 m2   /72   Pulse 109   Temp 98.2   Temp src 1   SpO2 97   Some recent data might be hidden       GENERAL: Alert and oriented to time place and person. Seated comfortably. In no distress.  She looks well overall.  Elderly.  I think she has lost some weight.  Very pleasant.    HEAD: Atraumatic and normocephalic.    EYES: DAMON, EOMI.  No pallor.  No icterus.    Oral cavity: no mucosal lesion or tonsillar enlargement.    NECK: supple. JVP normal.  No thyroid enlargement.    LYMPH NODES: No palpable, cervical, axillary or inguinal lymphadenopathy.    CHEST: clear to auscultation bilaterally.  Resonant to percussion throughout bilaterally.  Symmetrical breath movements bilaterally.  Port-A-Cath over the right upper chest looks unremarkable.    CVS: S1 and S2 are heard. Regular rate and rhythm.  No murmur or gallop or rub heard.  No peripheral edema.    ABDOMEN: Soft. Not tender.  Somewhat distended.  I think there may be some fluid.  No palpable hepatomegaly or splenomegaly.  No other mass palpable.  Bowel sounds heard.    EXTREMITIES: Warm.    SKIN: no rash, or purpura.  Few small old bruises on her forearms.  Hair is thinning.          Lab Results    Recent Results (from the past 24 hour(s))   Magnesium    Collection Time: 02/19/20   8:40 AM   Result Value Ref Range    Magnesium 1.8 1.8 - 2.6 mg/dL   Comprehensive Metabolic Panel    Collection Time: 02/19/20  8:40 AM   Result Value Ref Range    Sodium 137 136 - 145 mmol/L    Potassium 3.4 (L) 3.5 - 5.0 mmol/L    Chloride 101 98 - 107 mmol/L    CO2 24 22 - 31 mmol/L    Anion Gap, Calculation 12 5 - 18 mmol/L    Glucose 151 (H) 70 - 125 mg/dL    BUN 13 8 - 22 mg/dL    Creatinine 0.60 0.60 - 1.10 mg/dL    GFR MDRD Af Amer >60 >60 mL/min/1.73m2    GFR MDRD Non Af Amer >60 >60 mL/min/1.73m2    Bilirubin, Total 0.9 0.0 - 1.0 mg/dL    Calcium 8.8 8.5 - 10.5 mg/dL    Protein, Total 6.4 6.0 - 8.0 g/dL    Albumin 2.9 (L) 3.5 - 5.0 g/dL    Alkaline Phosphatase 217 (H) 45 - 120 U/L    AST 26 0 - 40 U/L    ALT 14 0 - 45 U/L   HM1 (CBC with Diff)    Collection Time: 02/19/20  8:40 AM   Result Value Ref Range    WBC 7.2 4.0 - 11.0 thou/uL    RBC 2.99 (L) 3.80 - 5.40 mill/uL    Hemoglobin 10.1 (L) 12.0 - 16.0 g/dL    Hematocrit 31.6 (L) 35.0 - 47.0 %     (H) 80 - 100 fL    MCH 33.8 27.0 - 34.0 pg    MCHC 32.0 32.0 - 36.0 g/dL    RDW 17.4 (H) 11.0 - 14.5 %    Platelets 126 (L) 140 - 440 thou/uL    MPV 10.3 8.5 - 12.5 fL    Neutrophils % 78 (H) 50 - 70 %    Lymphocytes % 10 (L) 20 - 40 %    Monocytes % 9 2 - 10 %    Eosinophils % 2 0 - 6 %    Basophils % 0 0 - 2 %    Neutrophils Absolute 5.6 2.0 - 7.7 thou/uL    Lymphocytes Absolute 0.8 0.8 - 4.4 thou/uL    Monocytes Absolute 0.7 0.0 - 0.9 thou/uL    Eosinophils Absolute 0.1 0.0 - 0.4 thou/uL    Basophils Absolute 0.0 0.0 - 0.2 thou/uL             Imaging    No results found.      Signed by: Ashia Kahn MD

## 2021-06-06 NOTE — PROGRESS NOTES
"2/28/2020    Referring Provider: Dr. Kahn/Leonidas Adams CNP    Presenting Information:  Glenis Pop returned to the [facility] to discuss her genetic testing results. Her blood was drawn on 11/21/2019.  CustomNext-Cancer (CancerNext + polyposis genes AXIN2, MSH3, NTHl1) testing was ordered from [facility]. This testing was done because of her personal history of pancreatic cancer and family history of breast cancer.    Genetic Testing Result: NEGATIVE  Glenis is negative for mutations in the APC, TRISHA, AXIN2, BARD1, BRCA1, BRCA2, BRIP1, BMPR1A, CDH1, CDK4, CDKN2A, CHEK2, DICER1, EPCAM, HOXB13, GREM1, MLH1, MRE11A, MSH2, MSH3, MSH6, MUTYH, NBN, NF1, NTHL1, PALB2, PMS2, POLD1, POLE, PTEN, RAD50, RAD51C, RAD51D, SMAD4, SMARCA4, STK11, and TP53 genes. No mutations were found in any of the 37 genes analyzed. This test involved sequencing and deletion/duplication analysis of all genes with the exception of EPCAM and GREM1 (deletions only).    Testing did not detect an identifiable mutation associated with Hereditary Breast and Ovarian Cancer syndrome (BRCA1, BRCA2), Radford syndrome (MLH1, MSH2, MSH6, PMS2, EPCAM), Familial Adenomatous Polyposis (APC), Hereditary Diffuse Gastric Cancer (CDH1), Familial Atypical Multiple Mole Melanoma syndrome (CDK4, CDKN2A), Juvenile Polyposis syndrome (BMPR1A, SMAD4), Cowden syndrome (PTEN), Li Fraumeni syndrome (TP53), Peutz-Jeghers syndrome (STK11), MUTYH Associated Polyposis (MUTYH), or Neurofibromatosis type 1 (NF1).    A copy of the test report can be found in the Media tab and named \"Genetics Scan[facility]\". The report is scanned in as a linked document.    Interpretation:  We discussed several different interpretations of this negative test result.    1. One explanation may be that there is a different gene or combination of genes and environment that are associated with the cancers in Glenis and/or her relatives.   2. Another explanation may be that " her sisters with breast cancer history or her paternal great aunt with breast cancer history did have a mutation in one of the genes Glenis was tested for, and she did not inherit it.  3. There is also a small possibility that there is a mutation in one of these genes and we could not find it with our current testing methods.       Screening:  Based on this negative test result, it is important for Glenis and her relatives to refer back to the family history for appropriate cancer screening.      Glenis should continue to follow her oncology care team's recommendations for the treatment of her pancreatic cancer.    We discussed that Glenis's close relatives likely have some increased risk for pancreatic cancer given her own history, but routine screening is typically not recommended.  Glenis 's relatives are encouraged to discuss this history with their healthcare providers. Glenis is encouraged to update me with any changes to the family history as a new pancreatic cancer diagnosis in the family may change screening recommendation for Glenis's relatives.     Based on her personal and family history, Glenis has a 6.7% lifetime risk of developing breast cancer based on the  SUE (v8) model.  Therefore, Glenis does not meet current National Comprehensive Cancer Network (NCCN) guidelines for high risk breast screening, which is offered to women with a 20% lifetime risk or higher. We discussed that Glenis is receiving fairly regular imaging with skull to mid thigh PET scans for her pancreatic cancer, and she likely does not need regular mammograms at this time.    Glenis s close female relatives (Glenis's daughters and nieces) remain at increased risk for breast cancer given their family history. Breast cancer screening is generally recommended to begin approximately 10 years younger than the earliest age of breast cancer diagnosis in the family but not before age 25, or at age 40, whichever comes first. In this family, screening may begin at  age 25. Breast screening options should be discussed with an individual's primary care provider and a genetic counselor, to determine at what age to begin screening, what screening is appropriate, and if additional screening (such as breast MRI) is necessary based on personal/family history factors.     Other population cancer screening options, such as those recommended by the American Cancer Society and the National Comprehensive Cancer Network (NCCN), are also appropriate for Glenis and her family. These screening recommendations may change if there are changes to Glenis's personal and/or family history. Final screening recommendations should be made by each individual's managing physician.    Inheritance:  We reviewed the autosomal dominant inheritance of mutations in these 37 genes. We discussed that Glenis cannot/did not pass on an identifiable mutation in these genes to her children based on this test result. Mutations in these genes do not skip generations.      Additional Testing Considerations:  Although Glenis's genetic testing result was negative, other relatives may still carry a gene mutation associated with breast cancer. Genetic counseling is recommended for her living sister with breast cancer history and her nieces and nephews whose mother had breast cancer history to discuss genetic testing options.  If any of these relatives do pursue genetic testing, Glenis is encouraged to contact me so that we may review the impact of their test results on her.    Summary:  We do not have an explanation for Glenis's pancreatic cancer or family history of breast cancer. Because of that, it is important that she continue with cancer screening based on her personal and family history as discussed above.    Genetic testing is rapidly advancing, and new cancer susceptibility genes will most likely be identified in the future. Therefore, I encouraged Glenis to contact me annually or if there are changes in her personal or family  history. This may change how we assess her cancer risk, screening, and the testing we would offer.    Plan:  1.  I provided Glenis with a copy of her test results today and a handout summarizing negative genetic test results (see after visit summary).  2. She plans to follow-up with her oncology care team.  3. She should contact me annually, or sooner if her family history changes.    If Glenis has any further questions, I encouraged her to contact me at [phone].    Time spent face to face: 20 minutes    Farheen Wise MS, [facility]  Licensed Genetic Counselor  [facility]  [phone]

## 2021-06-06 NOTE — PROGRESS NOTES
Pt amb into clinic with daughter, Mona, for chemo infusion. Reviewed plan of care. Pt premedicated. Pt tolerated infusions well. 5-fu via CADD pump programmed, double-checked; reviewed dc instructions. Pt given list of potassium-rich foods. Pt and family amb out of clinic.

## 2021-06-06 NOTE — PROGRESS NOTES
Clinic Care Coordination Contact  [facility] patient -> Care Coordination Transition Communication      Clinical Data:  Patient was hospitalized at [facility] from 3/3/20 to 3/9/20 with diagnosis of acute ischemic stroke with subdural hemorrhage.  Patient was discharged to [facility] on comfort cares and Hospice consult on d/c.     Transition to Facility:              Facility Name:  [facility]              Phone number:  [phone]      RN Care Coordinator called [facility] and spoke with Sania, who reported patient passed away this morning.        Plan:  No further outreach required from RN Care Coordinator.      Mary Grace Figueroa RN Clinic Care Coordinator

## 2021-06-16 PROBLEM — K63.9 LESION OF COLON: Status: ACTIVE | Noted: 2019-01-01

## 2021-06-16 PROBLEM — D69.59 CHEMOTHERAPY-INDUCED THROMBOCYTOPENIA: Status: ACTIVE | Noted: 2020-01-01

## 2021-06-16 PROBLEM — E83.42 HYPOMAGNESEMIA: Status: ACTIVE | Noted: 2019-01-01

## 2021-06-16 PROBLEM — E87.6 HYPOKALEMIA: Status: ACTIVE | Noted: 2019-01-01

## 2021-06-16 PROBLEM — Z51.11 ENCOUNTER FOR ANTINEOPLASTIC CHEMOTHERAPY: Status: ACTIVE | Noted: 2019-01-01

## 2021-06-16 PROBLEM — E88.09 HYPOALBUMINEMIA: Status: ACTIVE | Noted: 2019-01-01

## 2021-06-16 PROBLEM — I63.512 ACUTE ISCHEMIC LEFT MCA STROKE (H): Status: ACTIVE | Noted: 2020-01-01

## 2021-06-16 PROBLEM — I60.9 SAH (SUBARACHNOID HEMORRHAGE) (H): Status: ACTIVE | Noted: 2020-01-01

## 2021-06-16 PROBLEM — T45.1X5A CHEMOTHERAPY-INDUCED THROMBOCYTOPENIA: Status: ACTIVE | Noted: 2020-01-01

## 2021-06-16 PROBLEM — R47.01 APHASIA: Status: ACTIVE | Noted: 2020-01-01

## 2021-06-16 PROBLEM — C25.1 MALIGNANT NEOPLASM OF BODY OF PANCREAS (H): Status: ACTIVE | Noted: 2019-01-01

## 2021-06-19 NOTE — LETTER
Letter by Brunilda Goldstein RN at      Author: Brunilda Goldstein RN Service: -- Author Type: --    Filed:  Encounter Date: 8/14/2019 Status: (Other)       Dear Bonnie Donn    Thank you for choosing [facility] for your care.  We are committed to providing you with the highest quality and compassionate healthcare services.  The following information pertains to your first appointment with our clinic.    Date/Time of appointment:  Tuesday August 27th at 9:30 am. This allows time to complete forms, possible labs and nursing assessment.    Name of your Physician:  Ashia Kahn MD    What to bring to your appointment:    Completed Patient History/Initial Nursing Assessment and Medication/Allergy List (these forms were sent to you).    Any paperwork or films from your physician that we have asked you to bring.    Your current insurance card(s).    Parking:    Please refer to the map included to direct you to [facility].    You can park in any visitor/patient parking area you choose. There is no charge for parking at [facility].     Enter the hospital at the front/main entrance.      Please check in with our  representatives who will escort you to the clinic located in [address] of the [facility].    Please note appointments can last 1.5 to 2 hours.     We hope these instructions are helpful to you.  If you have any questions or concerns, please call us at [phone].  It is our pleasure to assist you.    Warm Regards,  Brunilda Goldstein  Nurse Navigator  [phone]

## 2021-06-19 NOTE — LETTER
Letter by Farheen Wise, Genetic Counselor at      Author: Farheen Wise, Genetic Counselor Service: -- Author Type: --    Filed:  Encounter Date: 11/21/2019 Status: Signed         Glenis Pop  [address] [facility] [address]      November 21, 2019      Dear Ms. Pop,    It was a pleasure meeting with you at [facility] on 11/21/2019.  Here is a copy of the progress note from your recent genetic counseling visit to the [facility].  If you have any additional questions, please feel free to call.    Referring Provider: Leonidas Adams, CNP, Dr. Ashia Kahn    Present for Today's Visit: Glenis and her daughter, Doris    Presenting Information:   I met with Glenis Pop today for genetic counseling at [facility] to discuss her recent diagnosis of pancreatic cancer and family history of breast cancer.  She is here today to review this history, cancer screening recommendations, and available genetic testing options.    Personal History:  Glenis is a 69 y.o. female. She was diagnosed with a stage 4 pancreatic cancer as the result of abdominal pain and discomfort. She has metastatic disease to her liver, lungs, and abdominal lymph nodes. Biopsy of a liver met completed on 08/15/2019 revealed a poorly differentiated adenocarcinoma consistent with a pancreatic primary. She is currently being followed by Dr. Kahn and is undergoing chemotherapy. A mass in her colon was also noted as part of her work-up (metastatses vs second primary unknown) and treatment is being deferred to treatment of her pancreatic cancer.     Her surgical history includes a total abdominal hysterectomy with bilateral salping-oophorectomy at age 30 due to endometriosis.      She had her first menstrual period at age 14, her first child at age 27, and went through menopause at age 30 as the result of her gynecologic surgery.  Glenis is s/p BRIDGER-BSO as mentioned above.   She reports no history of oral contraceptive use and that she has never been on hormone replacement therapy.      She has no received pap smears since age 30 as her cervix was removed at that time. Glenis reports that her most recent mammogram was over 5 years ago. She does recall a biopsy being completed in the past the returned benign. She has never had a colonoscopy. She does not regularly do any other cancer screening at this time.  Glenis reported no tobacco use, and no alcohol use.    Family History: (Please see scanned pedigree for detailed family history information)    Glenis's sister, age 78, has a history of breast cancer in her early 30's. Glenis reports that this sister had her ovaries removed due to endometriosis at an unknown age.     Glenis's sister passed away at age 75 with a history of breast cancer in her early 30's.    Glenis's father passed away at age 76 from lung cancer diagnosed in his 70's. Glenis reports that he was a smoker and likely had asbestos exposure due to his occupation. Glenis also reported that her father had a history of a melanoma on his forehead in his 60's.     Glenis's paternal uncle passed away in his late 70's with a history of a lip cancer at an unknown age. Glenis reports that this uncle was a smoker.     Glenis's paternal grandmother had a sister who had a history of breast cancer in her 70's.    Her maternal ethnicity is [place]. Her paternal ethnicity is [place]. Glenis also reported that she has English and [place] ancestry, although she is unsure of which side of the family this comes from. There is no known Ashkenazi Muslim ancestry on either side of her family. There is no reported consanguinity.    Discussion:    Glenis's personal history of pancreatic cancer and family history of breast cancer is suggestive of a hereditary cancer syndrome.    We reviewed the features of sporadic, familial, and hereditary cancers. In looking at Glenis's family history, it is possible that a cancer  susceptibility gene is present due to her pancreatic cancer and her sisters' early-onset breast cancers.    We discussed the natural history and genetics of hereditary breast and pancreatic cancers. A detailed handout regarding the information we discussed was provided to Glenis at the end of our appointment today and can be found in the after visit summary. Topics included: inheritance pattern, cancer risks, cancer screening recommendations, and also risks, benefits and limitations of testing.    We discussed the genes associated with increased pancreatic cancer risk including but not limited to BRCA1/2, Radford syndrome,Peutz Jeghers syndrome, and Familial Atypical Multiple Mole Melanoma.     We reviewed that the most common cause of hereditary breast cancer is Hereditary Breast and Ovarian Cancer (HBOC) syndrome, which is caused by mutations in the genes BRCA1 and BRCA2. BRCA1 and BRCA2 are two genes that increase the risk for breast and ovarian cancers, among others. Women who inherit a BRCA mutation have a 50 to 85% lifetime risk of breast cancer and a 15 to 45% lifetime risk of ovarian cancer. This is higher than the general population lifetime risks of 12% for breast cancer and less than 2% for ovarian cancer. Men with BRCA gene mutations have up to a 7% risk of breast cancer and 20% risk of prostate cancer. Other cancers, such as pancreatic cancer and melanoma, have also been associated with BRCA mutations.    Radford syndrome can be caused by a mutation in one of five genes:  MLH1, MSH2, MSH6, PMS2, and EPCAM.  Radford syndrome may present with polyps, but typically a small number.  The highest cancer risks associated with Radford syndrome include colon cancer, endometrial/uterine cancer, gastric cancer, and ovarian cancer.    Peutz-Jeghers syndrome (PJS) is caused by mutation in the STK11 gene. Individuals with PJS are predisposed to developing specific polyps (hamartomas), especially in the small intestine and  often presenting in childhood. In addition, PJS is associated with an increased risk for female breast cancer, colon cancer, stomach cancer, and pancreatic cancer.     Familial Atypical Multiple Mole Melanoma Syndrome (FAMMM) is caused by a single mutation in the CDKN2A gene.  The lifetime risk for melanoma is between 58-92% (PMID 93898874, PMID 61779339). Some studies indicate that these risks may vary, due to different factors. Individuals with FAMMM typically have many moles (more than 50), which can be atypical. People with FAMMM have increased risks for pancreatic cancer, and possibly other cancers. The exact risk of pancreatic cancer can depend on a persons specific CDKN2A mutation, but has been reported as about 17% by age 75 by one larger study (PMID 66137462). We discussed screening for melanoma (frequent skin exams) and pancreatic cancer (endoscopic ultrasonography (EUS) and/or MRI/magnetic resonance.    Based on her personal and family history, Glenis meets current National Comprehensive Cancer Network (NCCN) criteria for genetic testing of BRCA1 and BRCA2.      We discussed that there are additional genes that could cause increased risk for breast and pancreatic cancer. As many of these genes present with overlapping features in a family and accurate cancer risk cannot always be established based upon the pedigree analysis alone, it would be reasonable for Glenis to consider panel genetic testing to analyze multiple genes at once.    After our discussion, Glenis opted to proceed with testing via the CustomNext-Cancer (CancerNext +AXIN2, MSH3, NTHL1) panel through [facility].   Genetic testing is available for 37 genes associated with hereditary cancer: CustomNext-Cancer (APC, TRISHA, AXIN2, BARD1, BRCA1, BRCA2, BRIP1, BMPR1A, CDH1, CDK4, CDKN2A, CHEK2, DICER1, EPCAM, GREM1, HOXB13, MLH1, MRE11A, MSH2, MSH3, MSH6, MUTYH, NBN, NF1, NTHL1, PALB2, PMS2, POLD1, POLE, PTEN, RAD50, RAD51C, RAD51D, SMAD4, SMARCA4,  STK11, and TP53).  We discussed that many of the genes in the CustomNext-Cancer panel are associated with specific hereditary cancer syndromes and published management guidelines: Hereditary Breast and Ovarian Cancer syndrome (BRCA1, BRCA2), Radford syndrome (MLH1, MSH2, MSH6, PMS2, EPCAM), Familial Adenomatous Polyposis (APC), Hereditary Diffuse Gastric Cancer (CDH1), Familial Atypical Multiple Mole Melanoma syndrome (CDK4, CDKN2A), Juvenile Polyposis syndrome (BMPR1A, SMAD4), Cowden syndrome (PTEN), Li Fraumeni syndrome (TP53), Peutz-Jeghers syndrome (STK11), MUTYH Associated Polyposis (MUTYH), and Neurofibromatosis type 1 (NF1).   The TRISHA, BRIP1, CHEK2, GREM1, NBN, PALB2, POLD1, POLE, RAD51C, and RAD51D genes are associated with increased cancer risk and have published management guidelines for certain cancers.    The remaining genes (BARD1, DICER1, HOXB13, MRE11A, RAD50, and SMARCA4) are associated with increased cancer risk and may allow us to make medical recommendations when mutations are identified.    Consent was obtained and genetic testing for CustomNext-Cancer was sent to [facility].    Medical Management: For  Glenis, we reviewed that the information from genetic testing may determine:    additional cancer screening for which Glenis may qualify (i.e. mammogram and breast MRI, more frequent colonoscopies, more frequent dermatologic exams, etc.),    options for risk reducing surgeries Glenis could consider (i.e. bilateral mastectomy, etc.),      and targeted chemotherapies for Glenis's active cancer, or if she were to develop certain cancers in the future (i.e. immunotherapy for individuals with Radford syndrome, PARP inhibitors, etc.).     These recommendations and possible targeted chemotherapies will be discussed in detail once genetic testing is completed.    We discussed that Glenis's treatment planning is pending genetic testing results. For that reason, I will place a STAT request on the BRCAPlus  genes (TRISHA, BRCA1, BRCA2, CDH1, CHEK2, PALB2, PTEN, and TP53) in order to get these results back as soon as possible.      Plan:  1) Today Glenis elected to proceed with CustomNext-Cancer genetic testing (37 genes) through [facility].  2) A STAT request was placed on the BRCAPlus genes (including BRCA1 and BRCA2) and we will get these results back in about 2 weeks. I will call Glenis with these results as soon as they are available.   3) The results from the rest of the testing will be available in approximately 4 weeks.   4) Glenis will be contacted by our scheduling department to set up a result disclosure appointment either over the phone or in person as soon as full results are available.     Farheen Wise MS, [facility]  Licensed Genetic Counselor  [facility]  [phone]

## 2021-06-20 NOTE — LETTER
Letter by Farheen Wise, Genetic Counselor at      Author: Farheen Wise, Genetic Counselor Service: -- Author Type: --    Filed:  Encounter Date: 2/28/2020 Status: (Other)         Glenis Pop  [address] [facility] [address]      February 28, 2020      Dear Ms. Pop,    It was a pleasure meeting with you at [facility] on 02/28/2020.  Here is a copy of the progress note from your recent genetic counseling visit.  If you have any additional questions, please feel free to call.    Referring Provider: Dr. Kahn/Leonidas Adams CNP    Presenting Information:  Glenis Pop returned to the [facility] to discuss her genetic testing results. Her blood was drawn on 11/21/2019.  CustomNext-Cancer (CancerNext + polyposis genes AXIN2, MSH3, NTHl1) testing was ordered from [facility]. This testing was done because of her personal history of pancreatic cancer and family history of breast cancer.    Genetic Testing Result: NEGATIVE  Glenis is negative for mutations in the APC, TRISHA, AXIN2, BARD1, BRCA1, BRCA2, BRIP1, BMPR1A, CDH1, CDK4, CDKN2A, CHEK2, DICER1, EPCAM, HOXB13, GREM1, MLH1, MRE11A, MSH2, MSH3, MSH6, MUTYH, NBN, NF1, NTHL1, PALB2, PMS2, POLD1, POLE, PTEN, RAD50, RAD51C, RAD51D, SMAD4, SMARCA4, STK11, and TP53 genes. No mutations were found in any of the 37 genes analyzed. This test involved sequencing and deletion/duplication analysis of all genes with the exception of EPCAM and GREM1 (deletions only).    Testing did not detect an identifiable mutation associated with Hereditary Breast and Ovarian Cancer syndrome (BRCA1, BRCA2), Radford syndrome (MLH1, MSH2, MSH6, PMS2, EPCAM), Familial Adenomatous Polyposis (APC), Hereditary Diffuse Gastric Cancer (CDH1), Familial Atypical Multiple Mole Melanoma syndrome (CDK4, CDKN2A), Juvenile Polyposis syndrome (BMPR1A, SMAD4), Cowden syndrome (PTEN), Li Fraumeni syndrome (TP53), Peutz-Jeghers syndrome  "(STK11), MUTYH Associated Polyposis (MUTYH), or Neurofibromatosis type 1 (NF1).    A copy of the test report can be found in the Media tab and named \"Genetics Scan-[facility]\". The report is scanned in as a linked document.    Interpretation:  We discussed several different interpretations of this negative test result.    1. One explanation may be that there is a different gene or combination of genes and environment that are associated with the cancers in Glenis and/or her relatives.   2. Another explanation may be that her sisters with breast cancer history or her paternal great aunt with breast cancer history did have a mutation in one of the genes Glenis was tested for, and she did not inherit it.  3. There is also a small possibility that there is a mutation in one of these genes and we could not find it with our current testing methods.       Screening:  Based on this negative test result, it is important for Glenis and her relatives to refer back to the family history for appropriate cancer screening.      Glenis should continue to follow her oncology care team's recommendations for the treatment of her pancreatic cancer.    We discussed that Glenis's close relatives likely have some increased risk for pancreatic cancer given her own history, but routine screening is typically not recommended.  Glenis 's relatives are encouraged to discuss this history with their healthcare providers. Glenis is encouraged to update me with any changes to the family history as a new pancreatic cancer diagnosis in the family may change screening recommendation for Glenis's relatives.     Based on her personal and family history, Glenis has a 6.7% lifetime risk of developing breast cancer based on the  SUE (v8) model.  Therefore, Glenis does not meet current National Comprehensive Cancer Network (NCCN) guidelines for high risk breast screening, which is offered to women with a 20% lifetime risk or higher. We discussed that Glenis is receiving fairly " regular imaging with skull to mid thigh PET scans for her pancreatic cancer, and she likely does not need regular mammograms at this time.    Naima close female relatives (Glenis's daughters and nieces) remain at increased risk for breast cancer given their family history. Breast cancer screening is generally recommended to begin approximately 10 years younger than the earliest age of breast cancer diagnosis in the family but not before age 25, or at age 40, whichever comes first. In this family, screening may begin at age 25. Breast screening options should be discussed with an individual's primary care provider and a genetic counselor, to determine at what age to begin screening, what screening is appropriate, and if additional screening (such as breast MRI) is necessary based on personal/family history factors.     Other population cancer screening options, such as those recommended by the American Cancer Society and the National Comprehensive Cancer Network (NCCN), are also appropriate for lGenis and her family. These screening recommendations may change if there are changes to Glenis's personal and/or family history. Final screening recommendations should be made by each individual's managing physician.    Inheritance:  We reviewed the autosomal dominant inheritance of mutations in these 37 genes. We discussed that Glenis cannot/did not pass on an identifiable mutation in these genes to her children based on this test result. Mutations in these genes do not skip generations.      Additional Testing Considerations:  Although Glenis's genetic testing result was negative, other relatives may still carry a gene mutation associated with breast cancer. Genetic counseling is recommended for her living sister with breast cancer history and her nieces and nephews whose mother had breast cancer history to discuss genetic testing options.  If any of these relatives do pursue genetic testing, Glenis is encouraged to contact me so that  we may review the impact of their test results on her.    Summary:  We do not have an explanation for Glenis's pancreatic cancer or family history of breast cancer. Because of that, it is important that she continue with cancer screening based on her personal and family history as discussed above.    Genetic testing is rapidly advancing, and new cancer susceptibility genes will most likely be identified in the future. Therefore, I encouraged Glenis to contact me annually or if there are changes in her personal or family history. This may change how we assess her cancer risk, screening, and the testing we would offer.    Plan:  1.  I provided Glenis with a copy of her test results today and a handout summarizing negative genetic test results (see after visit summary).  2. She plans to follow-up with her oncology care team.  3. She should contact me annually, or sooner if her family history changes.    If Glenis has any further questions, I encouraged her to contact me at [phone].      Farheen Wise MS, [facility]  Licensed Genetic Counselor  [facility]  [phone]

## 2021-06-27 NOTE — PROGRESS NOTES
Progress Notes by Christina Douglas CNP at 9/3/2019  8:34 AM     Author: Misael, Christina, CNP Service: Interventional Radiology Author Type: Nurse Practitioner    Filed: 9/3/2019 10:19 AM Date of Service: 9/3/2019  8:34 AM Status: Signed    : Christina Douglas CNP (Nurse Practitioner)         Interventional Radiology - Pre Procedure Note (For H&P details please see Dr. Kahn's note from 8/27/19)  9/3/2019    Requested Procedure:  Port placement  Requesting Provider:  Dr. Kahn    HPI:  Glenis Pop is a 69 y.o. female with pancreatic cancer and plans for chemotherapy. Here today to have a port inserted.      IMAGING:    EXAM: NM PET CT SKULL TO MID THIGH  LOCATION: [facility]  DATE/TIME: 8/30/2019 9:13 AM     INDICATION: Pancreatic adenocarcinoma, staging. Malignant neoplasm of body of pancreas. Liver metastases. Initial treatment strategy.  COMPARISON: CT abdomen pelvis 08/01/2019 reviewed.  TECHNIQUE: Serum glucose level 129 mg/dL. One hour post intravenous administration of 8.1 mCi F-18 FDG, PET imaging was performed from the skull base to the mid thighs utilizing attenuation correction with concurrent axial CT and PET/CT image fusion.   Dose reduction techniques were used.     FINDINGS: Mass in the pancreatic head, as depicted on comparison CT estimated at 2.8 x 3.9 cm, demonstrates marked uptake (SUVmax 8.6), consistent with malignancy. Associated moderate upstream pancreatic parenchymal atrophy and duct dilatation. Numerous   mildly enlarged FDG avid upper abdominal gastrohepatic, peripancreatic, sheldon hepatic and retroperitoneal lymph nodes. A representative sheldon hepatic node measures 1.4 x 2.4 cm associated with moderate uptake (SUVmax 4.0). Numerous FDG avid masses   throughout the liver consistent with metastases, including a mass in the posterior right hepatic lobe measuring 3.1 cm associated with moderate uptake (SUVmax 5.8). Numerous scattered small bilateral pulmonary nodules  suspicious for pulmonary metastases,   some of the larger of which demonstrate mild FDG activity such as a 0.8 x 1.3 cm nodule in the left lower lobe with SUVmax 2.5. FDG avid subpleural nodule posteromedial right lower chest consistent with a metastasis. No suspicious focal skeletal uptake.     Diffuse opacification hypoplastic right maxillary sinus with surrounding periosteal thickening and low-level uptake suggesting chronic sinusitis. FDG avid nodule in the proximal ascending colon measuring 1.2 cm associated with marked uptake (SUVmax   18.1). A hypodense circumscribed nodule in the left adrenal demonstrates no FDG activity, likely benign adenoma. Moderate atherosclerotic calcifications including the coronary arteries. Tiny gallstones and/or sludge. Tiny nonobstructing stone left   kidney. Few small left renal cysts. Tiny fat-containing umbilical hernia. Mild colonic diverticulosis. Hysterectomy. Mild scattered degenerative changes in the spine. Benign vertebral body hemangiomas T9 and T12.     IMPRESSION:   CONCLUSION:  1. Metastatic pancreatic cancer with primary mass in the pancreatic head and metastases involving the liver, lungs and abdominal lymph nodes.  2. Left adrenal adenoma.  3. Chronic right maxillary sinusitis.  4. FDG avid nodule proximal ascending colon, likely an incidental colonic adenomatous neoplasm, although likely of secondary concern in this patient with metastatic pancreatic cancer.     NPO Status:  midnight  Anticoagulation/Antiplatelet/Bleeding tendencies:  none  Antibiotics:  Clindamycin 900mg IV for IR procedure    PAST MEDICAL HISTORY:  Reviewed    PAST SURGICAL HISTORY:  Reviewed    ALLERGIES  Amoxicillin    MEDICATIONS: See MAR    LABS:  INR (no units)   Date Value   08/12/2019 1.07     Hemoglobin (g/dL)   Date Value   08/27/2019 12.8     Platelets (thou/uL)   Date Value   08/27/2019 258     Creatinine (mg/dL)   Date Value   08/27/2019 0.70     Potassium (mmol/L)   Date Value    08/27/2019 4.4          EXAM:  Vitals:    09/03/19 0933   BP: 153/89   Pulse: 90   Resp: 16   Temp: 97.8  F (36.6  C)   SpO2: 97%       General:  Stable.  In no acute distress.  Neuro:  A&O x 3. Moves all extremities equally.  Resp:  Lungs clear to auscultation bilaterally.  Cardio:  S1S2 and reg, without murmur, clicks or rubs   Skin:  No excoriations, ecchymosis, erythema, lesions or open sores noted on upper chest/neck.     Pre-Sedation Assessment:  Mallampati Airway Classification: Class 1: entire tonsil clearly visble  Previous reaction to anesthesia/sedation: no  Sedation plan based on assessment: Moderate  Sleep Apnea: no  Dentures: no  COPD: no  ASA Classification: ASA 3 - Patient with moderate systemic disease with functional limitations    ASSESSMENT/PLAN:    Right side port placement with sedation.     The procedure, risks and moderate sedation were discussed with patient, all questions answered and patient agrees to proceed with the procedure.  Written consent obtained.      Christina Douglas  Interventional Radiology  [phone]

## 2023-04-03 PROBLEM — C78.7 MALIGNANT NEOPLASM METASTATIC TO LIVER (H): Status: ACTIVE | Noted: 2019-01-01

## 2024-02-14 NOTE — PATIENT INSTRUCTIONS - HE
Late entry:    The patient attended chemotherapy class today with daughter, Mona.  The patient was educated on:  -[facility] Educational Classes and Support Groups  -Chemotherapy: what it is and how it works  -Described a typical day at the treatment center and what the patient can expect  -Discussed port access and functions of the port   -Chemotherapy side effects and appropriate management of these side effects. SE discussed included and not limited to: Fatigue, nausea and vomiting, constipation, diarrhea, mucositis, alopecia, peripheral neuropathy, pain, anemia, thrombocytopenia, and neutropenia.    -Reasons to call the physician, including, fever>100.5, shaking chills, unusual bleeding or bruising, shortness of breath, vomiting>12hours, nausea >24 hours, unable to eat/drink >24 hours, painful urination, blood in urine or stool, soreness at the IV site, and painful mouth sores.  The  triage phone number was given to the patient and the patient was encouraged to call with any questions.  The patient was given a tour of the infusion center.  All questions were addressed to the best of my abilities and the patient was encouraged to call with any questions.  Time Spent: 1.5 hr    
home